# Patient Record
Sex: MALE | Race: WHITE | NOT HISPANIC OR LATINO | Employment: FULL TIME | URBAN - METROPOLITAN AREA
[De-identification: names, ages, dates, MRNs, and addresses within clinical notes are randomized per-mention and may not be internally consistent; named-entity substitution may affect disease eponyms.]

---

## 2017-01-12 ENCOUNTER — LAB CONVERSION - ENCOUNTER (OUTPATIENT)
Dept: OTHER | Facility: OTHER | Age: 57
End: 2017-01-12

## 2017-01-12 LAB — TSH SERPL DL<=0.05 MIU/L-ACNC: 1.79 MIU/L (ref 0.4–4.5)

## 2017-03-29 ENCOUNTER — ALLSCRIPTS OFFICE VISIT (OUTPATIENT)
Dept: OTHER | Facility: OTHER | Age: 57
End: 2017-03-29

## 2017-11-15 ENCOUNTER — GENERIC CONVERSION - ENCOUNTER (OUTPATIENT)
Dept: OTHER | Facility: OTHER | Age: 57
End: 2017-11-15

## 2018-01-18 NOTE — PROGRESS NOTES
Assessment    1  Encounter for preventive health examination (V70 0) (Z00 00)    Discussion/Summary  Impression: health maintenance visit  Currently, he eats an adequate diet and has an adequate exercise regimen  Prostate cancer screening: prostate cancer screening is current  Colorectal cancer screening: the risks and benefits of colorectal cancer screening were discussed and the patient declines colorectal cancer screening  The immunizations are up to date  Advice and education were given regarding nutrition, aerobic exercise, weight loss and cardiovascular risk reduction  Continue with current medications  Office visit one year  Referral to vascular surgery  I suggested evaluation for right shoulder through his employer/Work[employer]  I encouraged patient to have a colonoscopy  I discussed alternative to Fluoxetine  He is going to check with DOT regulations to ensure there are 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  History of Present Illness  HM, Adult Male: The patient is being seen for a health maintenance evaluation  The last health maintenance visit was >1 year(s) ago  Social History: Household members include spouse  He is   Work status: working full time  The patient is a former cigarette smoker  He reports occasional alcohol use  General Health: The patient's health since the last visit is described as good  He has regular dental visits  He denies vision problems  Vision care includes wearing reading glasses and an eye examination more than a year ago  He denies hearing loss  Lifestyle:  He consumes a diverse and healthy diet  He does not have any weight concerns  He exercises regularly  Exercise includes walking  He does not use tobacco    Screening: Prostate cancer screening includes last prostate-specific antigen testing 01/2017  Colorectal cancer screening includes no previous screening     Metabolic screening includes lipid profile performed 01/2017, glucose screening performed 01/2017 and thyroid function test performed 01/2017  Cardiovascular risk factors: hypertension and high LDL cholesterol  HPI: 59year old male here for wellness exam  active problems and PMH see chart  medications reviewed  labs 01/2017 see note  History of coronary artery disease status post stent  Nuclear stress test 12/2016 normal  No ischemia  Normal left ventricular function  Hyperlipidemia on Simvastatin 40 mg daily  lipid profile cholesterol 143  TGs 128  HDL 41  LDL 76  LFTs normal  FBS 91  hypertension  blood pressure stable on lisinopril 5 mg daily  he was taken off the beta blocker due to bradycardia  Review of Systems    Constitutional: 10 lb weight loss from 03/2015, but no recent weight gain and no recent weight loss  Eyes: no eyesight problems  Cardiovascular: CAD s/p stent  03/2015 nuclear stress test  No ischemia  No exercise induced dysrhythmias  Normal left ventricular systolic function  Ejection fraction 62%  No regional wall motion abnormalities  prior vascular screening normal including carotid artery u/s, AAA u/s and ALFREDO lower extremities  symptomatic varicose veins right leg  prior right leg venous ligation > 10 years ago , but no chest pain, no intermittent leg claudication, no palpitations and no extremity edema  Respiratory: no cough, no orthopnea, no wheezing, no shortness of breath during exertion and no PND  Gastrointestinal: no abdominal pain, no nausea, no vomiting, no constipation, no diarrhea and no blood in stools  Genitourinary: + ED  01/2017 PSA 0 6 , but no urinary hesitancy and no nocturia  Musculoskeletal: limb pain and history of gout stable on Allopurinol  uric acid level 10/2015 5 2  several month history of recurrent right shoulder pain  work related? no trauma or injury  , but no arthralgias and no myalgias  Integumentary: no rashes and no skin lesions  Neurological: no headache and no dizziness     Psychiatric: depression and no longer on Fluoxetine  PHQ 9 score 8  he did not feel well on Fluoxetine  he asked about generic Citalopram, but no anxiety  Endocrine: no muscle weakness  Hematologic/Lymphatic: no tendency for easy bleeding and no tendency for easy bruising  Active Problems    1  CAD (coronary atherosclerotic disease) (414 00) (I25 10)   2  Depression (311) (F32 9)   3  Essential hypertension (401 9) (I10)   4  Hyperlipidemia (272 4) (E78 5)   5  Hyperuricemia (790 6) (E79 0)   6  Male erectile dysfunction (607 84) (N52 9)    Past Medical History    · History of gout (V12 29) (Z87 39)    Surgical History    · History of Cath Stent Placement   · History of Hemorrhoidectomy   · History of Inguinal Hernia Repair   · History of Tonsillectomy    Family History  Father    · Family history of Aneurysm Of Abdominal Aorta   · Family history of Skin Cancer (V16 8)  Family History    · Family history of Coronary Artery Disease (V17 49)    Social History    · Former smoker (V15 82) (D56 609)    Current Meds   1  Allopurinol 300 MG Oral Tablet; Take 1 tablet daily; Therapy: 26WFC8139 to (Last Rx:55Cca8298)  Requested for: 97FSC8180 Ordered   2  Aspirin 81 MG TABS Recorded   3  Cialis 20 MG Oral Tablet; take as directed; Therapy: 00AXF2640 to (Jean Holder)  Requested for: 39WPZ5032; Last   Rx:07Oct2015 Ordered   4  [facility] Vitamin D 1000 UNIT CAPS Recorded   5  Lisinopril 5 MG Oral Tablet; Take 1 tablet daily as directed; Therapy: 75JIH9720 to (Last Rx:08Mar2017)  Requested for: 37PWB5286 Ordered   6  Simvastatin 40 MG Oral Tablet; Take 1 tablet daily as directed; Therapy: 65TIB9372 to (Last Rx:08Mar2017)  Requested for: 31QZT0282 Ordered    Allergies    1   No Known Drug Allergies    Vitals   Recorded: 03IDX1667 01:54PM   Temperature 96 4 F   Heart Rate 76   Respiration 16   Systolic 615   Diastolic 82   BP CUFF SIZE Large   Height 6 ft 2 in   Weight 242 lb    BMI Calculated 31 07   BSA Calculated 2 36 Physical Exam    Constitutional   General appearance: No acute distress, well appearing and well nourished  Eyes   Conjunctiva and lids: No erythema, swelling or discharge  Pupils and irises: Equal, round, reactive to light  Ophthalmoscopic examination: Normal fundi and optic discs  Ears, Nose, Mouth, and Throat   Otoscopic examination: Tympanic membranes translucent with normal light reflex  Canals patent without erythema  Oropharynx: Normal with no erythema, edema, exudate or lesions  Neck   Neck: Supple, symmetric, trachea midline, no masses  Thyroid: Normal, no thyromegaly  There were no thyroid nodules  Pulmonary   Auscultation of lungs: Clear to auscultation  Cardiovascular   Auscultation of heart: Normal rate and rhythm, normal S1 and S2, no murmurs  The heart rate was at 56 bpm  Heart sounds: no gallop heard  Carotid pulses: 2+ bilaterally  no bruit heard over the right carotid and no bruit heard over the left carotid  Abdominal aorta: Normal   Abdominal aorta: no bruit heard  Examination of extremities for edema and/or varicosities: Abnormal   no edema  varicosities noted on the right  no signs of superficial phlebitis  no no calf swelling or tenderness  Abdomen   Abdomen: Non-tender, no masses  Liver and spleen: No hepatomegaly or splenomegaly  Lymphatic   Palpation of lymph nodes in neck: No lymphadenopathy  Musculoskeletal   Gait and station: Normal     Range of motion: Normal   ROM right shoulder normal    Muscle strength/tone: Normal   Motor Strength Findings: normal upper extremity strength  Skin   Skin and subcutaneous tissue: Normal without rashes or lesions  Neurologic   Cranial nerves: Cranial nerves 2-12 intact      Psychiatric   Mood and affect: Normal        Results/Data  PHQ-9 Adult Depression Screening 29Mar2017 02:01PM User, s     Test Name Result Flag Reference   PHQ-9 Adult Depression Score 8     Over the last two weeks, how often have you been bothered by any of the following problems? Little interest or pleasure in doing things: More than half the days - 2  Feeling down, depressed, or hopeless: More than half the days - 2  Trouble falling or staying asleep, or sleeping too much: Not at all - 0  Feeling tired or having little energy: More than half the days - 2  Poor appetite or over eating: Not at all - 0  Feeling bad about yourself - or that you are a failure or have let yourself or your family down: Several days - 1  Trouble concentrating on things, such as reading the newspaper or watching television: Several days - 1  Moving or speaking so slowly that other people could have noticed   Or the opposite -  being so fidgety or restless that you have been moving around a lot more than usual: Not at all - 0  Thoughts that you would be better off dead, or of hurting yourself in some way: Not at all - 0   PHQ-9 Adult Depression Screening Negative     PHQ-9 Difficulty Level Somewhat difficult     PHQ-9 Severity Mild Depression       (1) CBC/PLT/DIFF [id number] 10:55AM [facility]     Test Name Result Flag Reference   WHITE BLOOD CELL COUNT 5 5 Thousand/uL  3 8-10 8   RED BLOOD CELL COUNT 5 05 Million/uL  4 20-5 80   HEMOGLOBIN 14 9 g/dL  13 2-17 1   HEMATOCRIT 44 8 %  38 5-50 0   MCV 88 7 fL  80 0-100 0   MCH 29 5 pg  27 0-33 0   MCHC 33 2 g/dL  32 0-36 0   RDW 13 9 %  11 0-15 0   PLATELET COUNT 830 Thousand/uL  140-400   MPV 9 5 fL  7 5-11 5   ABSOLUTE NEUTROPHILS 3394 cells/uL  6683-4135   ABSOLUTE LYMPHOCYTES 1689 cells/uL  850-3900   ABSOLUTE MONOCYTES 325 cells/uL  200-950   ABSOLUTE EOSINOPHILS 61 cells/uL     ABSOLUTE BASOPHILS 33 cells/uL  0-200   NEUTROPHILS 61 7 %     LYMPHOCYTES 30 7 %     MONOCYTES 5 9 %     EOSINOPHILS 1 1 %     BASOPHILS 0 6 %       (1) COMPREHENSIVE METABOLIC PANEL [id number] 96:52LZ [facility]     Test Name Result Flag Reference   GLUCOSE 91 mg/dL  65-99   Fasting reference interval   UREA NITROGEN (BUN) 15 mg/dL  7-25   CREATININE 0 84 mg/dL  0 70-1 33   For patients >52years of age, the reference limit  for Creatinine is approximately 13% higher for people  identified as -American  eGFR NON-AFR  AMERICAN 97 mL/min/1 73m2  > OR = 60   eGFR AFRICAN AMERICAN 112 mL/min/1 73m2  > OR = 60   BUN/CREATININE RATIO   3-29   NOT APPLICABLE (calc)   SODIUM 139 mmol/L  135-146   POTASSIUM 4 6 mmol/L  3 5-5 3   CHLORIDE 104 mmol/L     CARBON DIOXIDE 27 mmol/L  20-31   CALCIUM 9 3 mg/dL  8 6-10 3   PROTEIN, TOTAL 7 0 g/dL  6 1-8 1   ALBUMIN 4 5 g/dL  3 6-5 1   GLOBULIN 2 5 g/dL (calc)  1 9-3 7   ALBUMIN/GLOBULIN RATIO 1 8 (calc)  1 0-2 5   BILIRUBIN, TOTAL 0 6 mg/dL  0 2-1 2   ALKALINE PHOSPHATASE 60 U/L     AST 23 U/L  10-35   ALT 27 U/L  9-46     (1) LIPID PANEL, FASTING [id number] 10:55AM [facility] [place]     Test Name Result Flag Reference   CHOLESTEROL, TOTAL 143 mg/dL  125-200   HDL CHOLESTEROL 41 mg/dL  > OR = 40   TRIGLICERIDES 421 mg/dL  <150   LDL-CHOLESTEROL 76 mg/dL (calc)  <130   Desirable range <100 mg/dL for patients with CHD or  diabetes and <70 mg/dL for diabetic patients with  known heart disease  CHOL/HDLC RATIO 3 5 (calc)  < OR = 5 0   NON HDL CHOLESTEROL 102 mg/dL (calc)     Target for non-HDL cholesterol is 30 mg/dL higher than   LDL cholesterol target  (1) PSA (SCREEN) (Dx V76 44 Screen for Prostate Cancer) [id number] 10:55AM [facility]   REPORT COMMENT:  FASTING:YES     Test Name Result Flag Reference   PSA, TOTAL 0 6 ng/mL  < OR = 4 0   This test was performed using the Siemens  chemiluminescent method  Values obtained from  different assay methods cannot be used  interchangeably  PSA levels, regardless of  value, should not be interpreted as absolute  evidence of the presence or absence of disease       (Q) TSH, 3RD GENERATION [id number] 10:55AM [facility] [place]     Test Name Result Flag Reference   TSH 1 79 mIU/L  0 40-4 50     STRESS TEST ONLY, EXERCISE [id number] 12:00AM [facility] Ka, Shantelle Pina     Test Name Result Flag Reference   STRESS TEST ONLY, EXERCISE 12/14/2016       Summary / No summary entered :      No summary entered  Documents attached :      sStress Test - Coleman Goddard;  Enc: [id number] - Image Encounter - Coleman Goddard -      (Family Medicine) (Result Document)  (1) URIC ACID [id number] 10:25AM Coleman Goddard     Test Name Result Flag Reference   URIC ACID 5 2 mg/dL  4 0-8 0   Therapeutic target for gout patients: <6 0 mg/dL       Signatures   Electronically signed by : ROBBIE Apple ; Mar 29 2017  3:37PM EST                       (Author)

## 2018-01-22 VITALS
TEMPERATURE: 96.4 F | DIASTOLIC BLOOD PRESSURE: 82 MMHG | WEIGHT: 242 LBS | HEART RATE: 76 BPM | HEIGHT: 74 IN | BODY MASS INDEX: 31.06 KG/M2 | RESPIRATION RATE: 16 BRPM | SYSTOLIC BLOOD PRESSURE: 110 MMHG

## 2018-03-05 DIAGNOSIS — E78.00 HYPERCHOLESTEREMIA: Primary | ICD-10-CM

## 2018-03-05 DIAGNOSIS — I10 ESSENTIAL HYPERTENSION: ICD-10-CM

## 2018-03-05 RX ORDER — LISINOPRIL 5 MG/1
TABLET ORAL
Qty: 90 TABLET | Refills: 3 | Status: SHIPPED | OUTPATIENT
Start: 2018-03-05 | End: 2019-03-03 | Stop reason: SDUPTHER

## 2018-03-05 RX ORDER — SIMVASTATIN 40 MG
TABLET ORAL
Qty: 90 TABLET | Refills: 3 | Status: SHIPPED | OUTPATIENT
Start: 2018-03-05 | End: 2019-03-03 | Stop reason: SDUPTHER

## 2018-09-03 DIAGNOSIS — E79.0 HYPERURICEMIA: Primary | ICD-10-CM

## 2018-09-03 RX ORDER — ALLOPURINOL 300 MG/1
TABLET ORAL
Qty: 90 TABLET | Refills: 3 | Status: SHIPPED | OUTPATIENT
Start: 2018-09-03 | End: 2019-09-01 | Stop reason: SDUPTHER

## 2018-11-09 PROBLEM — Z12.11 SCREEN FOR COLON CANCER: Status: ACTIVE | Noted: 2018-11-09

## 2018-12-30 ENCOUNTER — ANESTHESIA EVENT (OUTPATIENT)
Dept: PERIOP | Facility: AMBULARY SURGERY CENTER | Age: 58
End: 2018-12-30
Payer: COMMERCIAL

## 2019-01-10 NOTE — ANESTHESIA PREPROCEDURE EVALUATION
Review of Systems/Medical History          Cardiovascular  Hyperlipidemia, Hypertension , CAD , CABG: X3 ,    Pulmonary  Not a smoker ,        GI/Hepatic    No GERD ,             Endo/Other     GYN       Hematology   Musculoskeletal       Neurology   Psychology           Physical Exam    Airway    Mallampati score: I  TM Distance: >3 FB  Neck ROM: full     Dental   No notable dental hx     Cardiovascular      Pulmonary      Other Findings        Anesthesia Plan  ASA Score- 3     Anesthesia Type- IV sedation with anesthesia with ASA Monitors  Additional Monitors:   Airway Plan:         Plan Factors-Patient not instructed to abstain from smoking on day of procedure  Patient did not smoke on day of surgery  Induction- intravenous  Postoperative Plan-     Informed Consent- Anesthetic plan and risks discussed with patient and spouse  I personally reviewed this patient with the CRNA  Discussed and agreed on the Anesthesia Plan with the CRNA  Carmen Olmos

## 2019-01-11 ENCOUNTER — HOSPITAL ENCOUNTER (OUTPATIENT)
Facility: AMBULARY SURGERY CENTER | Age: 59
Setting detail: OUTPATIENT SURGERY
Discharge: HOME | End: 2019-01-11
Attending: COLON & RECTAL SURGERY | Admitting: COLON & RECTAL SURGERY
Payer: COMMERCIAL

## 2019-01-11 ENCOUNTER — ANESTHESIA (OUTPATIENT)
Dept: PERIOP | Facility: AMBULARY SURGERY CENTER | Age: 59
End: 2019-01-11
Payer: COMMERCIAL

## 2019-01-11 VITALS
BODY MASS INDEX: 28.23 KG/M2 | TEMPERATURE: 98.9 F | SYSTOLIC BLOOD PRESSURE: 133 MMHG | OXYGEN SATURATION: 99 % | HEIGHT: 75 IN | RESPIRATION RATE: 18 BRPM | HEART RATE: 54 BPM | WEIGHT: 227 LBS | DIASTOLIC BLOOD PRESSURE: 82 MMHG

## 2019-01-11 PROCEDURE — 99243 OFF/OP CNSLTJ NEW/EST LOW 30: CPT | Performed by: COLON & RECTAL SURGERY

## 2019-01-11 PROCEDURE — G0121 COLON CA SCRN NOT HI RSK IND: HCPCS | Performed by: COLON & RECTAL SURGERY

## 2019-01-11 RX ORDER — NITROGLYCERIN 0.4 MG/1
0.4 TABLET SUBLINGUAL
COMMUNITY
End: 2021-11-17 | Stop reason: SDUPTHER

## 2019-01-11 RX ORDER — PROPOFOL 10 MG/ML
INJECTION, EMULSION INTRAVENOUS AS NEEDED
Status: DISCONTINUED | OUTPATIENT
Start: 2019-01-11 | End: 2019-01-11 | Stop reason: SURG

## 2019-01-11 RX ORDER — SODIUM CHLORIDE 9 MG/ML
125 INJECTION, SOLUTION INTRAVENOUS CONTINUOUS
Status: DISCONTINUED | OUTPATIENT
Start: 2019-01-11 | End: 2019-01-11 | Stop reason: HOSPADM

## 2019-01-11 RX ORDER — LIDOCAINE HYDROCHLORIDE 10 MG/ML
INJECTION, SOLUTION INFILTRATION; PERINEURAL AS NEEDED
Status: DISCONTINUED | OUTPATIENT
Start: 2019-01-11 | End: 2019-01-11 | Stop reason: SURG

## 2019-01-11 RX ADMIN — PROPOFOL 50 MG: 10 INJECTION, EMULSION INTRAVENOUS at 09:51

## 2019-01-11 RX ADMIN — SODIUM CHLORIDE 125 ML/HR: 0.9 INJECTION, SOLUTION INTRAVENOUS at 09:16

## 2019-01-11 RX ADMIN — PROPOFOL 50 MG: 10 INJECTION, EMULSION INTRAVENOUS at 09:57

## 2019-01-11 RX ADMIN — PROPOFOL 150 MG: 10 INJECTION, EMULSION INTRAVENOUS at 09:48

## 2019-01-11 RX ADMIN — PROPOFOL 30 MG: 10 INJECTION, EMULSION INTRAVENOUS at 10:01

## 2019-01-11 RX ADMIN — PROPOFOL 50 MG: 10 INJECTION, EMULSION INTRAVENOUS at 09:53

## 2019-01-11 RX ADMIN — PROPOFOL 50 MG: 10 INJECTION, EMULSION INTRAVENOUS at 09:55

## 2019-01-11 RX ADMIN — LIDOCAINE HYDROCHLORIDE ANHYDROUS 50 MG: 10 INJECTION, SOLUTION INFILTRATION at 09:48

## 2019-01-11 RX ADMIN — PROPOFOL 50 MG: 10 INJECTION, EMULSION INTRAVENOUS at 09:50

## 2019-01-11 NOTE — ANESTHESIA POSTPROCEDURE EVALUATION
Post-Op Assessment Note      CV Status:  Stable    Mental Status:  Alert and awake    Hydration Status:  Euvolemic    PONV Controlled:  Controlled    Airway Patency:  Patent    Post Op Vitals Reviewed: Yes          Staff: CRNA           /69 (01/11/19 1007)    Temp     Pulse 57 (01/11/19 1007)   Resp 15 (01/11/19 1007)    SpO2 99 % (01/11/19 1007)

## 2019-01-11 NOTE — OP NOTE
Colonoscopy Procedure Note  PATIENT NAME: Jose Willingham    :  1960  MRN: [id number]  Pt Location: [facility]  GI ROOM 01    SURGERY DATE: 2019    Surgeon(s) and Role:     * Ronald Ny MD - Primary    Preop Diagnosis:  Screen for colon cancer [Z12 11]    Post-Op Diagnosis Codes:     * Screen for colon cancer [Z12 11]    Procedure(s) (LRB):  COLONOSCOPY (N/A)    Specimen(s):  * No specimens in log *    Estimated Blood Loss:   Minimal    Drains:       Anesthesia Type:   IV Sedation with Anesthesia    Operative Indications:  Screen for colon cancer [Z12 11]    Procedure Details     Informed consent was obtained for the procedure, including sedation  Risks of perforation, hemorrhage, adverse drug reaction and aspiration were discussed  The patient was placed in the left lateral decubitus position  Based on the pre-procedure assessment, including review of the patient's medical history, medications, allergies, and review of systems, he had been deemed to be an appropriate candidate for conscious sedation; he was therefore sedated with the medications listed below  The patient was monitored continuously with ECG tracing, pulse oximetry, blood pressure monitoring, and direct observations  A rectal examination was performed  The variable-stiffness pediatric colonoscope was inserted into the rectum and advanced under direct vision to the cecum, which was identified by the ileocecal valve and appendiceal orifice  The quality of the colonic preparation was excellent  A careful inspection was made as the colonoscope was withdrawn, including a retroflexed view of the rectum; findings and interventions are described below  Appropriate photodocumentation was obtained      Findings:  -diverticulosis, moderate in degree, involving the sigmoid  -Otherwise no polyp tumor inflammation or mucosal pathology was seen throughout the colon up to cecum             Complications:  None; patient tolerated the procedure well             Disposition: PACU            Condition: stable    Attending Attestation: I was present for the entire procedure    Impression:    --diverticulosis, moderate in degree, involving the sigmoid  -Otherwise no polyp tumor inflammation or mucosal pathology was seen throughout the colon up to cecum    Recommendations:  -Repeat colonoscopy in 5 years   -Call [phone] with any signs or symptoms of diverticulitis or rectal bleeding due to  diverticular bleed        SIGNATURE: Alvaro Buckley MD  DATE: January 11, 2019  TIME: 10:08 AM

## 2019-01-11 NOTE — H&P
History and Physical   Colon and Rectal Surgery   Hamilton Allen 59 y o  male MRN: [id number]  Unit/Bed#: OR POOL Encounter: [id number]  01/11/19   9:31 AM      No chief complaint on file  History of Present Illness   HPI:  Hamilton Allen is a 59 y o  male who presents with screening colonoscopy  Historical Information   Past Medical History:   Diagnosis Date    CAD (coronary artery disease)     s/p stents    Hyperlipidemia     Hypertension      Past Surgical History:   Procedure Laterality Date    CORONARY ANGIOPLASTY WITH STENT PLACEMENT      HEMORROIDECTOMY      HERNIA REPAIR         Meds/Allergies     Prescriptions Prior to Admission   Medication    allopurinol (ZYLOPRIM) 300 mg tablet    aspirin 81 MG tablet    Cholecalciferol 1000 units capsule    lisinopril (ZESTRIL) 5 mg tablet    simvastatin (ZOCOR) 40 mg tablet    nitroglycerin (NITROSTAT) 0 4 mg SL tablet         Current Facility-Administered Medications:     sodium chloride 0 9 % infusion, 125 mL/hr, Intravenous, Continuous, Wil Latif MD, Last Rate: 125 mL/hr at 01/11/19 0916, 125 mL/hr at 01/11/19 0916    No Known Allergies      Social History   History   Alcohol Use    Yes     Comment: 1 per week     History   Drug Use No     History   Smoking Status    Former Smoker    Packs/day: 3 00    Years: 10 00    Quit date: 1/11/1989   Smokeless Tobacco    Never Used         Family History: History reviewed  No pertinent family history        Objective     Current Vitals:   Blood Pressure: 134/78 (01/11/19 0913)  Pulse: 63 (01/11/19 0913)  Temperature: (!) 97 1 °F (36 2 °C) (01/11/19 0913)  Temp Source: Temporal (01/11/19 0913)  Respirations: 18 (01/11/19 0913)  Height: 6' 3" (190 5 cm) (01/11/19 0913)  Weight - Scale: 103 kg (227 lb) (01/11/19 0913)  SpO2: 99 % (01/11/19 0913)  No intake or output data in the 24 hours ending 01/11/19 0931    Physical Exam:  General: No acute distress  Eyes: Normal   ENT: Normal   Neck: No JVD  Pulm: Normal in A&P  CV: NSR no murmur  Abdomen: Soft and normal on palpation, no mass, no tenderness, no guarding  Rectal: Normal sphincter tone, no perianal skin lesions  Extremities: Normal  Lymphatics: Normal        Lab Results: I have personally reviewed pertinent lab results  Imaging: I have personally reviewed pertinent reports  Patient was consented by myself for procedure as explained earlier with all the risks and benefits described  All questions answered  ASSESSMENT:  Rod Kincaid is a 59 y o  male who presents with screening colonoscopy  Ila Figueroa PLAN:  screening colonoscopy

## 2019-03-03 DIAGNOSIS — E78.00 HYPERCHOLESTEREMIA: ICD-10-CM

## 2019-03-03 DIAGNOSIS — I10 ESSENTIAL HYPERTENSION: ICD-10-CM

## 2019-03-04 RX ORDER — LISINOPRIL 5 MG/1
TABLET ORAL
Qty: 90 TABLET | Refills: 3 | Status: SHIPPED | OUTPATIENT
Start: 2019-03-04 | End: 2020-02-27

## 2019-03-04 RX ORDER — SIMVASTATIN 40 MG
TABLET ORAL
Qty: 90 TABLET | Refills: 3 | Status: SHIPPED | OUTPATIENT
Start: 2019-03-04 | End: 2020-02-27

## 2019-09-01 DIAGNOSIS — E79.0 HYPERURICEMIA: ICD-10-CM

## 2019-09-02 RX ORDER — ALLOPURINOL 300 MG/1
TABLET ORAL
Qty: 90 TABLET | Refills: 4 | Status: SHIPPED | OUTPATIENT
Start: 2019-09-02 | End: 2020-11-25

## 2020-01-23 ENCOUNTER — OFFICE VISIT (OUTPATIENT)
Dept: FAMILY MEDICINE CLINIC | Facility: CLINIC | Age: 60
End: 2020-01-23
Payer: COMMERCIAL

## 2020-01-23 VITALS
HEART RATE: 72 BPM | BODY MASS INDEX: 29.78 KG/M2 | SYSTOLIC BLOOD PRESSURE: 122 MMHG | HEIGHT: 75 IN | DIASTOLIC BLOOD PRESSURE: 82 MMHG | TEMPERATURE: 97.9 F | WEIGHT: 239.5 LBS

## 2020-01-23 DIAGNOSIS — Z12.5 SCREENING FOR PROSTATE CANCER: ICD-10-CM

## 2020-01-23 DIAGNOSIS — Z00.00 ROUTINE ADULT HEALTH MAINTENANCE: Primary | ICD-10-CM

## 2020-01-23 DIAGNOSIS — I25.10 CAD S/P PERCUTANEOUS CORONARY ANGIOPLASTY: ICD-10-CM

## 2020-01-23 DIAGNOSIS — Z11.59 ENCOUNTER FOR HEPATITIS C SCREENING TEST FOR LOW RISK PATIENT: ICD-10-CM

## 2020-01-23 DIAGNOSIS — Z11.4 ENCOUNTER FOR SCREENING FOR HIV: ICD-10-CM

## 2020-01-23 DIAGNOSIS — Z98.61 CAD S/P PERCUTANEOUS CORONARY ANGIOPLASTY: ICD-10-CM

## 2020-01-23 DIAGNOSIS — M1A.0720 CHRONIC IDIOPATHIC GOUT INVOLVING TOE OF LEFT FOOT WITHOUT TOPHUS: ICD-10-CM

## 2020-01-23 DIAGNOSIS — E55.9 VITAMIN D DEFICIENCY: ICD-10-CM

## 2020-01-23 DIAGNOSIS — E78.49 OTHER HYPERLIPIDEMIA: ICD-10-CM

## 2020-01-23 DIAGNOSIS — I83.811 VARICOSE VEINS OF RIGHT LOWER EXTREMITY WITH PAIN: ICD-10-CM

## 2020-01-23 DIAGNOSIS — I10 ESSENTIAL (PRIMARY) HYPERTENSION: ICD-10-CM

## 2020-01-23 PROBLEM — Z12.11 SCREEN FOR COLON CANCER: Status: RESOLVED | Noted: 2018-11-09 | Resolved: 2020-01-23

## 2020-01-23 PROCEDURE — 99396 PREV VISIT EST AGE 40-64: CPT | Performed by: FAMILY MEDICINE

## 2020-01-23 PROCEDURE — 3074F SYST BP LT 130 MM HG: CPT | Performed by: FAMILY MEDICINE

## 2020-01-23 PROCEDURE — 3008F BODY MASS INDEX DOCD: CPT | Performed by: FAMILY MEDICINE

## 2020-01-23 PROCEDURE — 3079F DIAST BP 80-89 MM HG: CPT | Performed by: FAMILY MEDICINE

## 2020-01-23 NOTE — PROGRESS NOTES
[address] 56 y o  male   DATE: January 23, 2020   Assessment and Plan:  56 y o  male exam      1  Health Maintenance  - Colonoscopy? 1/11/2019- [id number] with Dr Shara Bray  - Labs: PSA, uric acid, CMP, TSH, CBC, microalb:creat, IV, Hep C  - Immunizations: Reviewed  TDAP UTD 2017  Recommend yearly flu vaccine, PPSV23 and Shingrix, patient declined all      2  Other diagnoses addressed today:   Problem List Items Addressed This Visit        Cardiovascular and Mediastinum    CAD S/P percutaneous coronary angioplasty     S/p 3 stents after abnormal cath in 2008  F/w [facility] (Dr Oli Salas)  Asymptomatic, normal stress Echo 10/31/2018  Continue ASA 81mg, simvastatin 40mg, ramipril 5mg  BB discontinued by Cardiology 2 years ago per patient         Relevant Orders    TSH, 3rd generation with Free T4 reflex    CBC and differential    Essential (primary) hypertension     BP Readings from Last 3 Encounters:   01/23/20 122/82   01/11/19 133/82   03/29/17 110/82     Lab Results   Component Value Date    CREATININE 0 84 01/11/2017     Controlled on current regimen: Ramipril 5mg daily  Check BMP           Relevant Orders    Comprehensive metabolic panel    TSH, 3rd generation with Free T4 reflex    Microalbumin / creatinine urine ratio       Musculoskeletal and Integument    Chronic idiopathic gout 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 tophus     Has been on Allopurinol 300mg daily since 2008, no flare ups since then  Check uric acid         Relevant Orders    Uric acid       Other    Other hyperlipidemia     Recent FLP for Cardiology 11/19 WNL  Reviewed healthy, low cholesterol diet  Continue Simvastatin 40mg daily           Vitamin D deficiency    Relevant Orders    Vitamin D 25 hydroxy      Other Visit Diagnoses     Routine adult health maintenance    -  Primary    Encounter for screening for HIV        Relevant Orders    HIV 1/2 AG-AB combo    Encounter for hepatitis C screening test for low risk patient        Relevant Orders    Hepatitis C antibody    Varicose veins of right lower extremity with pain        Relevant Orders    Ambulatory referral to Vascular Surgery    Screening for prostate cancer        Relevant Orders    PSA, Total Screen          BMI Counseling: Body mass index is 29 94 kg/m²  The BMI is above normal  Nutrition recommendations include moderation in carbohydrate intake and reducing intake of cholesterol  RTC 1 year for annual  visit or sooner PRN    Subjective:    Alice Whitaker is a 56 y o  male and is here for his comprehensive physical exam      No acute complaints  Last seen in this office 2 5 years ago  Works for [employer] and has worked there for 42 years   History of CAD s/p stents in 2008- no chest pain, palpitations, MOYA   Recently saw his cardiology  Gout- last episode of gout was when he had stents was placed, now on allopurinol, always on big toe  HLD- well controlled with simvastatin, eats an unhealthy diet, no routine exercise  Overdue for dental visit and eye doctor    Histories Updated and Reviewed 1/23/2020:  Patient's Medications   New Prescriptions    No medications on file   Previous Medications    ALLOPURINOL (ZYLOPRIM) 300 MG TABLET    TAKE 1 TABLET DAILY    ASPIRIN 81 MG TABLET    Take 81 mg by mouth daily    CHOLECALCIFEROL 1000 UNITS CAPSULE    Take 1 capsule by mouth daily    LISINOPRIL (ZESTRIL) 5 MG TABLET    TAKE 1 TABLET DAILY AS DIRECTED    NITROGLYCERIN (NITROSTAT) 0 4 MG SL TABLET    Place 0 4 mg under the tongue    SIMVASTATIN (ZOCOR) 40 MG TABLET    TAKE 1 TABLET DAILY AS DIRECTED   Modified Medications    No medications on file   Discontinued Medications    No medications on file     No Known Allergies  Past Medical History:   Diagnosis Date    CAD (coronary artery disease)     s/p stents    Hyperlipidemia     Hypertension      Social History     Socioeconomic History    Marital status: /Civil Union     Spouse name: Not on file  Number of children: Not on file    Years of education: Not on file    Highest education level: Not on file   Occupational History    Not on file   Social Needs    Financial resource strain: Not on file    Food insecurity:     Worry: Not on file     Inability: Not on file    Transportation needs:     Medical: Not on file     Non-medical: Not on file   Tobacco Use    Smoking status: Former Smoker     Packs/day: 3 00     Years: 10 00     Pack years: 30 00     Last attempt to quit: 1989     Years since quittin 0    Smokeless tobacco: Never Used   Substance and Sexual Activity    Alcohol use: Yes     Comment: 1 per week    Drug use: No    Sexual activity: Not on file   Lifestyle    Physical activity:     Days per week: Not on file     Minutes per session: Not on file    Stress: Not on file   Relationships    Social connections:     Talks on phone: Not on file     Gets together: Not on file     Attends Jain service: Not on file     Active member of club or organization: Not on file     Attends meetings of clubs or organizations: Not on file     Relationship status: Not on file    Intimate partner violence:     Fear of current or ex partner: Not on file     Emotionally abused: Not on file     Physically abused: Not on file     Forced sexual activity: Not on file   Other Topics Concern    Not on file   Social History Narrative    Not on file     Immunization History   Administered Date(s) Administered    Tdap 2017     Review of Systems:  Review of Systems   Constitutional: Negative for chills and fever  HENT: Negative for ear pain  Eyes: Negative for visual disturbance  Respiratory: Negative for cough and shortness of breath  Cardiovascular: Positive for leg swelling (right leg varicose vein)  Negative for chest pain and palpitations  Gastrointestinal: Negative for abdominal pain, diarrhea, nausea and vomiting  Musculoskeletal: Positive for arthralgias     Skin: Negative for rash  Neurological: Negative for headaches  Hematological: Does not bruise/bleed easily  PHQ-9 Depression Screening    PHQ-9:    Frequency of the following problems over the past two weeks:       Little interest or pleasure in doing things:  0 - not at all  Feeling down, depressed, or hopeless:  0 - not at all  PHQ-2 Score:  0         Objective:  /82 (BP Location: Left arm, Patient Position: Sitting, Cuff Size: Standard)   Pulse 72   Temp 97 9 °F (36 6 °C)   Ht 6' 3" (1 905 m)   Wt 109 kg (239 lb 8 oz)   BMI 29 94 kg/m²   Physical Exam   Constitutional: He is oriented to person, place, and time  He appears well-developed and well-nourished  No distress  HENT:   Head: Normocephalic and atraumatic  Mouth/Throat: Oropharynx is clear and moist  No oropharyngeal exudate  Eyes: Pupils are equal, round, and reactive to light  EOM are normal  Right eye exhibits no discharge  Left eye exhibits no discharge  Neck: Normal range of motion  Neck supple  No JVD present  Cardiovascular: Normal rate, regular rhythm and normal heart sounds  No murmur heard  Pulmonary/Chest: Effort normal and breath sounds normal  No stridor  No respiratory distress  He has no wheezes  Abdominal: Soft  Bowel sounds are normal  There is no tenderness  There is no rebound and no guarding  Musculoskeletal: Normal range of motion  He exhibits no edema or tenderness  Neurological: He is alert and oriented to person, place, and time  Skin: Skin is warm and dry  He is not diaphoretic  No erythema  Psychiatric: He has a normal mood and affect  His behavior is normal    Vitals reviewed  Patient Care Team:  Jody Alfredo MD as PCP - Charline Howard MD as Hollis Cool MD    Note: Portions of the record may have been created with voice recognition software    Occasional wrong word or "sound a like" substitutions may have occurred due to the inherent limitations of voice recognition software  Read the chart carefully and recognize, using context, where substitutions have occurred

## 2020-01-24 NOTE — ASSESSMENT & PLAN NOTE
Recent FLP for Cardiology 11/19 WNL  Reviewed healthy, low cholesterol diet  Continue Simvastatin 40mg daily

## 2020-01-24 NOTE — ASSESSMENT & PLAN NOTE
BP Readings from Last 3 Encounters:   01/23/20 122/82   01/11/19 133/82   03/29/17 110/82     Lab Results   Component Value Date    CREATININE 0 84 01/11/2017     Controlled on current regimen: Ramipril 5mg daily  Check BMP

## 2020-01-24 NOTE — ASSESSMENT & PLAN NOTE
S/p 3 stents after abnormal cath in 2008  F/w [facility] (Dr Maikel Carranza)  Asymptomatic, normal stress Echo 10/31/2018  Continue ASA 81mg, simvastatin 40mg, ramipril 5mg  BB discontinued by Cardiology 2 years ago per patient

## 2020-02-01 LAB
CREAT ?TM UR-SCNC: 21.3 UMOL/L
EXTERNAL HIV SCREEN: NORMAL
HCV AB SER-ACNC: NEGATIVE

## 2020-02-27 DIAGNOSIS — E78.00 HYPERCHOLESTEREMIA: ICD-10-CM

## 2020-02-27 DIAGNOSIS — I10 ESSENTIAL HYPERTENSION: ICD-10-CM

## 2020-02-27 RX ORDER — SIMVASTATIN 40 MG
TABLET ORAL
Qty: 90 TABLET | Refills: 4 | Status: SHIPPED | OUTPATIENT
Start: 2020-02-27 | End: 2021-05-24

## 2020-02-27 RX ORDER — LISINOPRIL 5 MG/1
TABLET ORAL
Qty: 90 TABLET | Refills: 4 | Status: SHIPPED | OUTPATIENT
Start: 2020-02-27 | End: 2021-05-24

## 2020-04-10 ENCOUNTER — TELEMEDICINE (OUTPATIENT)
Dept: FAMILY MEDICINE CLINIC | Facility: CLINIC | Age: 60
End: 2020-04-10
Payer: COMMERCIAL

## 2020-04-10 DIAGNOSIS — Z98.61 CAD S/P PERCUTANEOUS CORONARY ANGIOPLASTY: Primary | ICD-10-CM

## 2020-04-10 DIAGNOSIS — I10 ESSENTIAL (PRIMARY) HYPERTENSION: ICD-10-CM

## 2020-04-10 DIAGNOSIS — I25.10 CAD S/P PERCUTANEOUS CORONARY ANGIOPLASTY: Primary | ICD-10-CM

## 2020-04-10 PROCEDURE — 99214 OFFICE O/P EST MOD 30 MIN: CPT | Performed by: FAMILY MEDICINE

## 2020-11-25 DIAGNOSIS — E79.0 HYPERURICEMIA: ICD-10-CM

## 2020-11-25 RX ORDER — ALLOPURINOL 300 MG/1
TABLET ORAL
Qty: 90 TABLET | Refills: 3 | Status: SHIPPED | OUTPATIENT
Start: 2020-11-25 | End: 2021-11-22 | Stop reason: SDUPTHER

## 2021-05-24 DIAGNOSIS — I10 ESSENTIAL HYPERTENSION: ICD-10-CM

## 2021-05-24 DIAGNOSIS — E78.00 HYPERCHOLESTEREMIA: ICD-10-CM

## 2021-05-24 RX ORDER — SIMVASTATIN 40 MG
TABLET ORAL
Qty: 90 TABLET | Refills: 0 | Status: SHIPPED | OUTPATIENT
Start: 2021-05-24 | End: 2021-08-23 | Stop reason: SDUPTHER

## 2021-05-24 RX ORDER — LISINOPRIL 5 MG/1
TABLET ORAL
Qty: 90 TABLET | Refills: 0 | Status: SHIPPED | OUTPATIENT
Start: 2021-05-24 | End: 2021-08-23 | Stop reason: SDUPTHER

## 2021-08-23 DIAGNOSIS — I10 ESSENTIAL HYPERTENSION: ICD-10-CM

## 2021-08-23 DIAGNOSIS — E78.00 HYPERCHOLESTEREMIA: ICD-10-CM

## 2021-08-23 RX ORDER — SIMVASTATIN 40 MG
40 TABLET ORAL DAILY
Qty: 90 TABLET | Refills: 0 | Status: SHIPPED | OUTPATIENT
Start: 2021-08-23

## 2021-08-23 RX ORDER — LISINOPRIL 5 MG/1
5 TABLET ORAL DAILY
Qty: 90 TABLET | Refills: 0 | Status: SHIPPED | OUTPATIENT
Start: 2021-08-23

## 2021-09-01 ENCOUNTER — TELEPHONE (OUTPATIENT)
Dept: FAMILY MEDICINE CLINIC | Facility: CLINIC | Age: 61
End: 2021-09-01

## 2021-09-01 NOTE — TELEPHONE ENCOUNTER
[facility] called stating insurance does not cover Zocor  Patient's prescription would just have to say Simvastatin  Please advise  Can this be resent?  [phone]

## 2021-09-01 NOTE — TELEPHONE ENCOUNTER
I called [facility] and spoke with Pharmacist Isabel Alcazar gave verbal for the generic Simvastatin with 3 refills  And also the Lisinopril

## 2021-11-09 ENCOUNTER — APPOINTMENT (OUTPATIENT)
Dept: LAB | Facility: CLINIC | Age: 61
End: 2021-11-09
Payer: COMMERCIAL

## 2021-11-09 DIAGNOSIS — E79.0 HYPERURICEMIA: ICD-10-CM

## 2021-11-09 DIAGNOSIS — Z12.5 SCREENING FOR PROSTATE CANCER: ICD-10-CM

## 2021-11-09 LAB
PSA SERPL-MCNC: 0.5 NG/ML (ref 0–4)
URATE SERPL-MCNC: 4.7 MG/DL (ref 4.2–8)

## 2021-11-09 PROCEDURE — 84550 ASSAY OF BLOOD/URIC ACID: CPT

## 2021-11-09 PROCEDURE — G0103 PSA SCREENING: HCPCS

## 2021-11-17 ENCOUNTER — OFFICE VISIT (OUTPATIENT)
Dept: FAMILY MEDICINE CLINIC | Facility: CLINIC | Age: 61
End: 2021-11-17
Payer: COMMERCIAL

## 2021-11-17 VITALS
TEMPERATURE: 96.3 F | SYSTOLIC BLOOD PRESSURE: 118 MMHG | RESPIRATION RATE: 18 BRPM | HEIGHT: 74 IN | HEART RATE: 72 BPM | BODY MASS INDEX: 30.42 KG/M2 | DIASTOLIC BLOOD PRESSURE: 82 MMHG | OXYGEN SATURATION: 97 % | WEIGHT: 237 LBS

## 2021-11-17 DIAGNOSIS — Z98.61 CAD S/P PERCUTANEOUS CORONARY ANGIOPLASTY: ICD-10-CM

## 2021-11-17 DIAGNOSIS — E78.2 MIXED HYPERLIPIDEMIA: ICD-10-CM

## 2021-11-17 DIAGNOSIS — E55.9 VITAMIN D DEFICIENCY: ICD-10-CM

## 2021-11-17 DIAGNOSIS — I10 ESSENTIAL (PRIMARY) HYPERTENSION: ICD-10-CM

## 2021-11-17 DIAGNOSIS — R68.82 LOW LIBIDO: ICD-10-CM

## 2021-11-17 DIAGNOSIS — I25.10 CAD S/P PERCUTANEOUS CORONARY ANGIOPLASTY: ICD-10-CM

## 2021-11-17 DIAGNOSIS — Z00.00 WELL ADULT EXAM: Primary | ICD-10-CM

## 2021-11-17 DIAGNOSIS — R73.01 IMPAIRED FASTING GLUCOSE: ICD-10-CM

## 2021-11-17 DIAGNOSIS — N52.9 MALE ERECTILE DISORDER: ICD-10-CM

## 2021-11-17 PROCEDURE — 3008F BODY MASS INDEX DOCD: CPT | Performed by: FAMILY MEDICINE

## 2021-11-17 PROCEDURE — 1036F TOBACCO NON-USER: CPT | Performed by: FAMILY MEDICINE

## 2021-11-17 PROCEDURE — 99396 PREV VISIT EST AGE 40-64: CPT | Performed by: FAMILY MEDICINE

## 2021-11-17 PROCEDURE — 3725F SCREEN DEPRESSION PERFORMED: CPT | Performed by: FAMILY MEDICINE

## 2021-11-17 RX ORDER — NITROGLYCERIN 0.4 MG/1
0.4 TABLET SUBLINGUAL
Qty: 25 TABLET | Refills: 1 | Status: SHIPPED | OUTPATIENT
Start: 2021-11-17

## 2021-11-17 RX ORDER — TADALAFIL 20 MG/1
20 TABLET ORAL DAILY PRN
Qty: 10 TABLET | Refills: 3 | Status: SHIPPED | OUTPATIENT
Start: 2021-11-17 | End: 2021-11-22 | Stop reason: SDUPTHER

## 2021-11-22 DIAGNOSIS — N52.9 MALE ERECTILE DISORDER: ICD-10-CM

## 2021-11-22 DIAGNOSIS — E79.0 HYPERURICEMIA: ICD-10-CM

## 2021-11-22 RX ORDER — TADALAFIL 20 MG/1
20 TABLET ORAL DAILY PRN
Qty: 10 TABLET | Refills: 3 | Status: SHIPPED | OUTPATIENT
Start: 2021-11-22

## 2021-11-22 RX ORDER — ALLOPURINOL 300 MG/1
300 TABLET ORAL DAILY
Qty: 90 TABLET | Refills: 3 | Status: SHIPPED | OUTPATIENT
Start: 2021-11-22

## 2021-11-22 RX ORDER — ALLOPURINOL 300 MG/1
TABLET ORAL
Qty: 90 TABLET | Refills: 3 | OUTPATIENT
Start: 2021-11-22

## 2022-08-26 DIAGNOSIS — I10 ESSENTIAL HYPERTENSION: ICD-10-CM

## 2022-08-26 RX ORDER — LISINOPRIL 5 MG/1
5 TABLET ORAL DAILY
Qty: 90 TABLET | Refills: 3 | Status: SHIPPED | OUTPATIENT
Start: 2022-08-26

## 2022-11-10 DIAGNOSIS — E78.00 HYPERCHOLESTEREMIA: ICD-10-CM

## 2022-11-10 DIAGNOSIS — I10 ESSENTIAL HYPERTENSION: ICD-10-CM

## 2022-11-10 DIAGNOSIS — Z12.5 SCREENING FOR PROSTATE CANCER: ICD-10-CM

## 2022-11-10 DIAGNOSIS — E79.0 HYPERURICEMIA: ICD-10-CM

## 2022-11-10 DIAGNOSIS — E79.0 HYPERURICEMIA: Primary | ICD-10-CM

## 2022-11-10 DIAGNOSIS — E78.2 MIXED HYPERLIPIDEMIA: ICD-10-CM

## 2022-11-10 DIAGNOSIS — R73.01 IMPAIRED FASTING GLUCOSE: ICD-10-CM

## 2022-11-10 RX ORDER — ALLOPURINOL 300 MG/1
300 TABLET ORAL DAILY
Qty: 90 TABLET | Refills: 3 | Status: SHIPPED | OUTPATIENT
Start: 2022-11-10

## 2022-11-10 NOTE — TELEPHONE ENCOUNTER
[facility]  Patient requesting orders for labs for upcoming physical appointment  Call when in is chart

## 2022-11-15 ENCOUNTER — APPOINTMENT (OUTPATIENT)
Dept: LAB | Facility: CLINIC | Age: 62
End: 2022-11-15

## 2022-11-15 DIAGNOSIS — Z12.5 SCREENING FOR PROSTATE CANCER: ICD-10-CM

## 2022-11-15 DIAGNOSIS — E79.0 HYPERURICEMIA: ICD-10-CM

## 2022-11-15 DIAGNOSIS — R68.82 LOW LIBIDO: ICD-10-CM

## 2022-11-15 LAB
ALBUMIN SERPL BCP-MCNC: 3.9 G/DL (ref 3.5–5)
ALP SERPL-CCNC: 71 U/L (ref 46–116)
ALT SERPL W P-5'-P-CCNC: 44 U/L (ref 12–78)
ANION GAP SERPL CALCULATED.3IONS-SCNC: 6 MMOL/L (ref 4–13)
AST SERPL W P-5'-P-CCNC: 23 U/L (ref 5–45)
BASOPHILS # BLD AUTO: 0.05 THOUSANDS/ÂΜL (ref 0–0.1)
BASOPHILS NFR BLD AUTO: 1 % (ref 0–1)
BILIRUB SERPL-MCNC: 0.64 MG/DL (ref 0.2–1)
BUN SERPL-MCNC: 17 MG/DL (ref 5–25)
CALCIUM SERPL-MCNC: 9.1 MG/DL (ref 8.3–10.1)
CHLORIDE SERPL-SCNC: 108 MMOL/L (ref 96–108)
CHOLEST SERPL-MCNC: 155 MG/DL
CO2 SERPL-SCNC: 26 MMOL/L (ref 21–32)
CREAT SERPL-MCNC: 0.87 MG/DL (ref 0.6–1.3)
EOSINOPHIL # BLD AUTO: 0.09 THOUSAND/ÂΜL (ref 0–0.61)
EOSINOPHIL NFR BLD AUTO: 2 % (ref 0–6)
ERYTHROCYTE [DISTWIDTH] IN BLOOD BY AUTOMATED COUNT: 13 % (ref 11.6–15.1)
GFR SERPL CREATININE-BSD FRML MDRD: 91 ML/MIN/1.73SQ M
GLUCOSE P FAST SERPL-MCNC: 99 MG/DL (ref 65–99)
HCT VFR BLD AUTO: 43.2 % (ref 36.5–49.3)
HDLC SERPL-MCNC: 37 MG/DL
HGB BLD-MCNC: 14.6 G/DL (ref 12–17)
IMM GRANULOCYTES # BLD AUTO: 0.02 THOUSAND/UL (ref 0–0.2)
IMM GRANULOCYTES NFR BLD AUTO: 0 % (ref 0–2)
LDLC SERPL CALC-MCNC: 74 MG/DL (ref 0–100)
LYMPHOCYTES # BLD AUTO: 1.7 THOUSANDS/ÂΜL (ref 0.6–4.47)
LYMPHOCYTES NFR BLD AUTO: 31 % (ref 14–44)
MCH RBC QN AUTO: 30.8 PG (ref 26.8–34.3)
MCHC RBC AUTO-ENTMCNC: 33.8 G/DL (ref 31.4–37.4)
MCV RBC AUTO: 91 FL (ref 82–98)
MONOCYTES # BLD AUTO: 0.48 THOUSAND/ÂΜL (ref 0.17–1.22)
MONOCYTES NFR BLD AUTO: 9 % (ref 4–12)
NEUTROPHILS # BLD AUTO: 3.16 THOUSANDS/ÂΜL (ref 1.85–7.62)
NEUTS SEG NFR BLD AUTO: 57 % (ref 43–75)
NONHDLC SERPL-MCNC: 118 MG/DL
NRBC BLD AUTO-RTO: 0 /100 WBCS
PLATELET # BLD AUTO: 177 THOUSANDS/UL (ref 149–390)
PMV BLD AUTO: 11.2 FL (ref 8.9–12.7)
POTASSIUM SERPL-SCNC: 4.5 MMOL/L (ref 3.5–5.3)
PROT SERPL-MCNC: 7.7 G/DL (ref 6.4–8.4)
PSA SERPL-MCNC: 0.7 NG/ML (ref 0–4)
RBC # BLD AUTO: 4.74 MILLION/UL (ref 3.88–5.62)
SODIUM SERPL-SCNC: 140 MMOL/L (ref 135–147)
TRIGL SERPL-MCNC: 218 MG/DL
URATE SERPL-MCNC: 5 MG/DL (ref 3.5–8.5)
WBC # BLD AUTO: 5.5 THOUSAND/UL (ref 4.31–10.16)

## 2022-11-15 PROCEDURE — 36415 COLL VENOUS BLD VENIPUNCTURE: CPT | Performed by: FAMILY MEDICINE

## 2022-11-15 PROCEDURE — 83036 HEMOGLOBIN GLYCOSYLATED A1C: CPT | Performed by: FAMILY MEDICINE

## 2022-11-15 PROCEDURE — 85025 COMPLETE CBC W/AUTO DIFF WBC: CPT | Performed by: FAMILY MEDICINE

## 2022-11-15 PROCEDURE — 80061 LIPID PANEL: CPT | Performed by: FAMILY MEDICINE

## 2022-11-15 PROCEDURE — 80053 COMPREHEN METABOLIC PANEL: CPT | Performed by: FAMILY MEDICINE

## 2022-11-16 LAB
EST. AVERAGE GLUCOSE BLD GHB EST-MCNC: 108 MG/DL
HBA1C MFR BLD: 5.4 %

## 2022-11-21 ENCOUNTER — OFFICE VISIT (OUTPATIENT)
Dept: FAMILY MEDICINE CLINIC | Facility: CLINIC | Age: 62
End: 2022-11-21

## 2022-11-21 VITALS
WEIGHT: 262 LBS | HEART RATE: 60 BPM | SYSTOLIC BLOOD PRESSURE: 132 MMHG | TEMPERATURE: 97.6 F | DIASTOLIC BLOOD PRESSURE: 72 MMHG | RESPIRATION RATE: 16 BRPM | HEIGHT: 74 IN | BODY MASS INDEX: 33.62 KG/M2

## 2022-11-21 DIAGNOSIS — M17.0 PRIMARY OSTEOARTHRITIS OF BOTH KNEES: ICD-10-CM

## 2022-11-21 DIAGNOSIS — G89.29 CHRONIC LEFT-SIDED LOW BACK PAIN WITHOUT SCIATICA: ICD-10-CM

## 2022-11-21 DIAGNOSIS — I25.10 CAD S/P PERCUTANEOUS CORONARY ANGIOPLASTY: ICD-10-CM

## 2022-11-21 DIAGNOSIS — Z98.61 CAD S/P PERCUTANEOUS CORONARY ANGIOPLASTY: ICD-10-CM

## 2022-11-21 DIAGNOSIS — R73.01 IMPAIRED FASTING GLUCOSE: ICD-10-CM

## 2022-11-21 DIAGNOSIS — E78.2 MIXED HYPERLIPIDEMIA: ICD-10-CM

## 2022-11-21 DIAGNOSIS — M54.50 CHRONIC LEFT-SIDED LOW BACK PAIN WITHOUT SCIATICA: ICD-10-CM

## 2022-11-21 DIAGNOSIS — Z00.00 WELL ADULT EXAM: Primary | ICD-10-CM

## 2022-11-21 DIAGNOSIS — I10 ESSENTIAL (PRIMARY) HYPERTENSION: ICD-10-CM

## 2022-11-21 RX ORDER — METHOCARBAMOL 500 MG/1
500 TABLET, FILM COATED ORAL 4 TIMES DAILY PRN
Qty: 30 TABLET | Refills: 0 | Status: SHIPPED | OUTPATIENT
Start: 2022-11-21

## 2022-11-21 RX ORDER — NITROGLYCERIN 0.4 MG/1
0.4 TABLET SUBLINGUAL
Qty: 25 TABLET | Refills: 1 | Status: SHIPPED | OUTPATIENT
Start: 2022-11-21

## 2022-11-21 NOTE — PROGRESS NOTES
Name: Jayy Fuller      : 1960      MRN: [id number]  Encounter Provider: Frederick Boxer, MD  Encounter Date: 2022   Encounter department: [address] [place] [address]     1  Well adult exam    2  Essential (primary) hypertension    3  Mixed hyperlipidemia    4  Impaired fasting glucose    5  CAD S/P percutaneous coronary angioplasty  -     nitroglycerin (NITROSTAT) 0 4 mg SL tablet; Place 1 tablet (0 4 mg total) under the tongue every 5 (five) minutes as needed for chest pain    6  Chronic left-sided low back pain without sciatica  -     methocarbamol (ROBAXIN) 500 mg tablet; Take 1 tablet (500 mg total) by mouth 4 (four) times a day as needed for muscle spasms    7  Primary osteoarthritis of both knees    Continue with current medications  OV 1 year  Orthopedic surgery referral offered he declined for now-waiting until he goes on Medicare    BMI Counseling: Body mass index is 34 1 kg/m²  The BMI is above normal  Nutrition recommendations include reducing portion sizes, decreasing overall calorie intake, consuming healthier snacks, moderation in carbohydrate intake, reducing intake of saturated fat and trans fat and reducing intake of cholesterol  Exercise recommendations include exercising 3-5 times per week  Depression Screening Follow-up Plan: Patient's depression screening was positive with a PHQ-2 score of 3  Their PHQ-9 score was 7  Patient assessed for underlying major depression  They have no active suicidal ideations  Brief counseling provided and recommend additional follow-up/re-evaluation next office visit  Patient's depressive symptoms likely due to other medical condition  Would recommend treatment of underlying condition  Will continue to monitor at next office visit  Patient advised to follow-up with PCP for further management  Subjective     26-year-old male here for Wellness exam   Medications reviewed    Hospitalizations/surgeries/SH/FH reviewed see note  Hypertension blood pressure have been stable on Lisinopril 5 mg daily  11/2022 creatinine 0 87  GFR 91  Electrolytes normal  Hgb 14 6  Hyperlipidemia and CAD  lipid profile 11/2022 Cholesterol 155  Triglycerides 218 increased from 161  HDL 37  LDL 74  LFTs normal  Impaired fasting glucose 11/2021 FBS 99 decreased from 101  A1c 5 4       Recent Results (from the past 672 hour(s))   Hemoglobin A1C    Collection Time: 11/15/22 11:27 AM   Result Value Ref Range    Hemoglobin A1C 5 4 Normal 3 8-5 6%; PreDiabetic 5 7-6 4%;  Diabetic >=6 5%; Glycemic control for adults with diabetes <7 0% %     mg/dl   Comprehensive metabolic panel    Collection Time: 11/15/22 11:27 AM   Result Value Ref Range    Sodium 140 135 - 147 mmol/L    Potassium 4 5 3 5 - 5 3 mmol/L    Chloride 108 96 - 108 mmol/L    CO2 26 21 - 32 mmol/L    ANION GAP 6 4 - 13 mmol/L    BUN 17 5 - 25 mg/dL    Creatinine 0 87 0 60 - 1 30 mg/dL    Glucose, Fasting 99 65 - 99 mg/dL    Calcium 9 1 8 3 - 10 1 mg/dL    AST 23 5 - 45 U/L    ALT 44 12 - 78 U/L    Alkaline Phosphatase 71 46 - 116 U/L    Total Protein 7 7 6 4 - 8 4 g/dL    Albumin 3 9 3 5 - 5 0 g/dL    Total Bilirubin 0 64 0 20 - 1 00 mg/dL    eGFR 91 ml/min/1 73sq m   CBC and differential    Collection Time: 11/15/22 11:27 AM   Result Value Ref Range    WBC 5 50 4 31 - 10 16 Thousand/uL    RBC 4 74 3 88 - 5 62 Million/uL    Hemoglobin 14 6 12 0 - 17 0 g/dL    Hematocrit 43 2 36 5 - 49 3 %    MCV 91 82 - 98 fL    MCH 30 8 26 8 - 34 3 pg    MCHC 33 8 31 4 - 37 4 g/dL    RDW 13 0 11 6 - 15 1 %    MPV 11 2 8 9 - 12 7 fL    Platelets 439 868 - 051 Thousands/uL    nRBC 0 /100 WBCs    Neutrophils Relative 57 43 - 75 %    Immat GRANS % 0 0 - 2 %    Lymphocytes Relative 31 14 - 44 %    Monocytes Relative 9 4 - 12 %    Eosinophils Relative 2 0 - 6 %    Basophils Relative 1 0 - 1 %    Neutrophils Absolute 3 16 1 85 - 7 62 Thousands/µL    Immature Grans Absolute 0 02 0 00 - 0 20 Thousand/uL Lymphocytes Absolute 1 70 0 60 - 4 47 Thousands/µL    Monocytes Absolute 0 48 0 17 - 1 22 Thousand/µL    Eosinophils Absolute 0 09 0 00 - 0 61 Thousand/µL    Basophils Absolute 0 05 0 00 - 0 10 Thousands/µL   Lipid panel    Collection Time: 11/15/22 11:27 AM   Result Value Ref Range    Cholesterol 155 See Comment mg/dL    Triglycerides 218 (H) See Comment mg/dL    HDL, Direct 37 (L) >=40 mg/dL    LDL Calculated 74 0 - 100 mg/dL    Non-HDL-Chol (CHOL-HDL) 118 mg/dl   Uric acid    Collection Time: 11/15/22 11:27 AM   Result Value Ref Range    Uric Acid 5 0 3 5 - 8 5 mg/dL   PSA, Total Screen    Collection Time: 11/15/22 11:27 AM   Result Value Ref Range    PSA 0 7 0 0 - 4 0 ng/mL               Review of Systems   Constitutional: Positive for unexpected weight change (25 lb weight gain from 11/2021)  Negative for appetite change, chills and fever  HENT: Negative for congestion, ear pain, rhinorrhea, sore throat and trouble swallowing  Eyes: Negative for visual disturbance  Respiratory: Negative for cough, shortness of breath and wheezing  Prior COV 19 infection  Cardiovascular: Negative for chest pain, palpitations and leg swelling  See HPI  status post stent 02/2015   10/2020 stress echocardiogram normal no evidence of ischemia  Ejection fraction 60-65%  Mild mitral regurgitation  Trace aortic insufficiency  Gastrointestinal: Negative for abdominal pain, blood in stool, constipation, diarrhea, nausea and vomiting  01/2019 colonoscopy   Endocrine: Negative for polydipsia and polyuria  Genitourinary: Negative for difficulty urinating         + ED uses prn Cialis  Lab Results       Component                Value               Date                       PSA                      0 7                 11/15/2022                 PSA                      0 5                 11/09/2021                          Musculoskeletal: Positive for arthralgias and back pain   Negative for myalgias  OA knees limiting his activity level  Prior Ortho evaluation "bone on bone" per patient Chronic recurrent L sided lower back pain  No radicular pain  Hyperuricemia on Allopurinol 300 mg daily      Lab Results       Component                Value               Date                       URICACID                 5 0                 11/15/2022               Skin: Negative for rash  Allergic/Immunologic: Negative for environmental allergies  Neurological: Negative for dizziness and headaches  Hematological: Negative for adenopathy  Does not bruise/bleed easily  Psychiatric/Behavioral: Negative for dysphoric mood and sleep disturbance  The patient is not nervous/anxious  Past Medical History:   Diagnosis Date   • CAD (coronary artery disease)     s/p stents   • Chronic idiopathic gout 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 tophus 3/18/2015   • Hyperlipidemia    • Hypertension      Past Surgical History:   Procedure Laterality Date   • CORONARY ANGIOPLASTY WITH STENT PLACEMENT     • HEMORROIDECTOMY     • HERNIA REPAIR     • KS COLONOSCOPY FLX DX W/COLLJ SPEC WHEN PFRMD N/A 2019    Procedure: COLONOSCOPY;  Surgeon: Josh Meyers MD;  Location: [facility];   Service: Colorectal     Family History   Problem Relation Age of Onset   • Coronary artery disease Mother      Social History     Socioeconomic History   • Marital status: /Civil Union     Spouse name: None   • Number of children: None   • Years of education: None   • Highest education level: None   Occupational History   • None   Tobacco Use   • Smoking status: Former     Packs/day: 3 00     Years: 10 00     Pack years: 30 00     Types: Cigarettes     Quit date: 1989     Years since quittin 8   • Smokeless tobacco: Never   Substance and Sexual Activity   • Alcohol use: Yes     Comment: 1 per week   • Drug use: No   • Sexual activity: Yes     Partners: Female     Birth control/protection: None   Other Topics Concern   • None   Social History Narrative   • None     Social Determinants of Health     Financial Resource Strain: Not on file   Food Insecurity: Not on file   Transportation Needs: Not on file   Physical Activity: Not on file   Stress: Not on file   Social Connections: Not on file   Intimate Partner Violence: Not on file   Housing Stability: Not on file     Current Outpatient Medications on File Prior to Visit   Medication Sig   • allopurinol (ZYLOPRIM) 300 mg tablet Take 1 tablet (300 mg total) by mouth daily   • aspirin 81 MG tablet Take 81 mg by mouth daily   • Cholecalciferol 1000 units capsule Take 1 capsule by mouth daily   • lisinopril (ZESTRIL) 5 mg tablet Take 1 tablet (5 mg total) by mouth daily   • Multiple Vitamins-Minerals (MENS 50+ MULTI VITAMIN/MIN PO) Take 1 tablet by mouth every morning   • simvastatin (ZOCOR) 40 mg tablet Take 1 tablet (40 mg total) by mouth daily   • tadalafil (CIALIS) 20 MG tablet Take 1 tablet (20 mg total) by mouth daily as needed for erectile dysfunction   • [DISCONTINUED] nitroglycerin (NITROSTAT) 0 4 mg SL tablet Place 1 tablet (0 4 mg total) under the tongue every 5 (five) minutes as needed for chest pain   • [DISCONTINUED] DOCOSAHEXAENOIC ACID-EPA PO Take 1 capsule by mouth every morning     No Known Allergies  Immunization History   Administered Date(s) Administered   • COVID-19 PFIZER VACCINE 0 3 ML IM 05/27/2021, 06/22/2021   • COVID-19 Pfizer vac (Piter-sucrose, gray cap) 12 yr+ IM 01/27/2022   • Tdap 02/01/2017       Objective     /72   Pulse 60   Temp 97 6 °F (36 4 °C)   Resp 16   Ht 6' 1 5" (1 867 m)   Wt 119 kg (262 lb)   BMI 34 10 kg/m²     BP Readings from Last 3 Encounters:   11/21/22 132/72   11/17/21 118/82   01/23/20 122/82     Wt Readings from Last 3 Encounters:   11/21/22 119 kg (262 lb)   11/17/21 108 kg (237 lb)   01/23/20 109 kg (239 lb 8 oz)         Physical Exam  Vitals and nursing note reviewed     Constitutional:       General: He is not in acute distress  Appearance: He is well-developed  HENT:      Right Ear: Tympanic membrane and ear canal normal       Left Ear: Tympanic membrane and ear canal normal       Mouth/Throat:      Mouth: No oral lesions  Pharynx: Oropharynx is clear  Eyes:      General: No scleral icterus  Extraocular Movements: Extraocular movements intact  Conjunctiva/sclera: Conjunctivae normal       Pupils: Pupils are equal, round, and reactive to light  Neck:      Thyroid: No thyroid mass or thyromegaly  Vascular: No carotid bruit or JVD  Trachea: No tracheal deviation  Cardiovascular:      Rate and Rhythm: Normal rate and regular rhythm  Pulses:           Carotid pulses are 2+ on the right side and 2+ on the left side  Heart sounds: Normal heart sounds  No murmur heard  No gallop  Pulmonary:      Effort: Pulmonary effort is normal  No respiratory distress  Breath sounds: Normal breath sounds  No wheezing or rales  Abdominal:      General: Bowel sounds are normal  There is no distension or abdominal bruit  Palpations: Abdomen is soft  There is no hepatomegaly, splenomegaly or mass  Tenderness: There is no abdominal tenderness  There is no guarding or rebound  Musculoskeletal:      Right knee: Crepitus present  No bony tenderness  No tenderness  Left knee: Crepitus present  No bony tenderness  Decreased range of motion  No tenderness  Right lower leg: No edema  Left lower leg: No edema  Lymphadenopathy:      Cervical: No cervical adenopathy  Upper Body:      Right upper body: No supraclavicular adenopathy  Left upper body: No supraclavicular adenopathy  Skin:     Findings: No rash  Nails: There is no clubbing  Neurological:      General: No focal deficit present  Mental Status: He is alert and oriented to person, place, and time     Psychiatric:         Mood and Affect: Mood normal          Behavior: Behavior normal  Thought Content:  Thought content normal          Judgment: Judgment normal        Rudy Mortensen MD

## 2023-03-15 DIAGNOSIS — E78.00 HYPERCHOLESTEREMIA: ICD-10-CM

## 2023-03-15 DIAGNOSIS — E79.0 HYPERURICEMIA: ICD-10-CM

## 2023-03-15 RX ORDER — SIMVASTATIN 40 MG
40 TABLET ORAL DAILY
Qty: 90 TABLET | Refills: 0 | Status: SHIPPED | OUTPATIENT
Start: 2023-03-15

## 2023-03-15 RX ORDER — ALLOPURINOL 300 MG/1
300 TABLET ORAL DAILY
Qty: 90 TABLET | Refills: 3 | Status: SHIPPED | OUTPATIENT
Start: 2023-03-15

## 2023-04-27 ENCOUNTER — EVALUATION (OUTPATIENT)
Dept: PHYSICAL THERAPY | Facility: CLINIC | Age: 63
End: 2023-04-27

## 2023-04-27 DIAGNOSIS — M47.896 OTHER SPONDYLOSIS, LUMBAR REGION: Primary | ICD-10-CM

## 2023-04-27 RX ORDER — CELECOXIB 100 MG/1
100 CAPSULE ORAL 2 TIMES DAILY
COMMUNITY

## 2023-04-27 NOTE — PROGRESS NOTES
PT Evaluation     Today's date: 2023  Patient name: Martha Curiel  : 1960  MRN: [id number]  Referring provider: Dixie Jimenez DO  Dx:   Encounter Diagnosis     ICD-10-CM    1  Other spondylosis, lumbar region  M47 896           Start Time: 1115  Stop Time: 1200  Total time in clinic (min): 45 minutes    Assessment  Assessment details: Martha Curiel is a pleasant 70 y o  male who presents with persistent LBP with left LE radiculopathy  The patient's greatest concerns are the pain he is experiencing, concern at no signs of improvement and fear of not being able to keep active  No further referral appears necessary at this time based upon examination results  Primary movement impairment diagnosis of increased neural tension in the left LE resulting in pathoanatomical symptoms of pain in the low back and left posterior LE and limiting his ability to exercise or recreation, lift, perform yard work, sit, stand and walk  Primary Impairments:  1) Decreased lumbar segmental mobility   2) Decreased hip mobility   3) Impaired core stabilization and control     Etiologic factors include repetitive poor body mechanics  Impairments: abnormal or restricted ROM, activity intolerance, impaired physical strength, lacks appropriate home exercise program, pain with function and poor posture     Symptom irritability: lowUnderstanding of Dx/Px/POC: good   Prognosis: good  Prognosis details: Positive prognostic indicators include positive attitude toward recovery and good understanding of diagnosis and treatment plan options  Negative prognostic indicators include chronicity of symptoms, degree of peripheralization and multiple concurrent orthopedic problems  Goals  Patient will be independent with home exercise program    Patient will be able to manage symptoms independently  Short Term Goals 2-4 wks   1   Pt will be independent with initial HEP   2  Pt will decrease pain in the left LE to < 2/10 at worst   3  Pt will improve lumbar AROM to WNL and pain free     Long Term Goals 6-8 wks  1  Pt will improve L-spine segmental mobility to WNL and pain free   2  Pt will report standing/sitting tolerance >30 minutes   3  Pt will improve FOTO score to >76 by d/c      Plan  Patient would benefit from: skilled physical therapy  Planned modality interventions: Modalities PRN  Planned therapy interventions: activity modification, manual therapy, neuromuscular re-education, patient education, therapeutic activities, therapeutic exercise, graded activity, home exercise program, behavior modification, self care, joint mobilization and abdominal trunk stabilization  Frequency: 2x week  Duration in weeks: 6  Treatment plan discussed with: patient        Subjective Evaluation    History of Present Illness  Mechanism of injury: Lindsey Taylor presents with c/c of chronic low back pain and left LE radiculopathy  Pt reports flare up last fall when he was putting a chair together and had increased pain in the low back and left LE  Symptoms lasted for about 3 wks then resolved with some exercise  Pt had similar instance into the left LE a few weeks later which also resolved  Pt recently saw ortho for injections into the knees and was referred to OPPT of current concerns of pain into the left LE  Reports improvement of symptoms in B/L knees following cortisone injections last week   Hx of OA in B/L knees which limits weight bearing activities   Pain location: left buttock and and posterior LE, descriptors: sharp   Aggravating factors: sitting for long period of time, standing for extended period of time, getting in and out of truck/car  Relieving factors: change in position   24hr pain pattern: 1/10 (current), 0/10 (best), 5/10 (worst)   Imaging: x-rays (no significant osseous injury, mild OA, stenosis)   Previous treatments: medication   Occupation/recreation: retired, goes to [employer] 3x/wk   Sleeping: no disturbed sleep, primarily side sleeper   Patient concerns: decreasing pain in the left LE, remaining active, losing weight     Cancer:  Family history: No  Personal history: No  Recent weight loss: No  Night pain: No    Infection:  Recent history of infection: No  Recent or current fever: No    Fracture:  Recent history of trauma: No  History of corticosteroid use: No  History of bone density disorders: No    Cauda Equina:  Recent change in bowel or bladder control: No  Presence of numbness or saddle anesthesia: No  Leg weakness or difficulty walking: No          Objective     Postural Observations  Seated posture: poor  Standing posture: fair  Correction of posture: makes symptoms better        Tenderness     Lumbar Spine  No tenderness in the spinous process, left transverse process and right transverse process  Left Hip   No tenderness in the PSIS  Right Hip   No tenderness in the PSIS  Neurological Testing     Sensation     Lumbar   Left   Intact: light touch    Right   Intact: light touch    Reflexes   Left   Patellar (L4): normal (2+)  Achilles (S1): normal (2+)    Right   Patellar (L4): normal (2+)  Achilles (S1): normal (2+)    Active Range of Motion     Lumbar   Flexion:  WFL  Extension:  Restriction level: moderate  Left lateral flexion:  Restriction level: moderate  Right lateral flexion:  Restriction level: moderate  Left rotation:  Restriction level: minimal  Right rotation:  Restriction level: minimal    Joint Play     Hypomobile: L1, L2, L3, L4, L5 and S1     Strength/Myotome Testing     Lumbar   Left   Normal strength    Right   Normal strength    Tests     Lumbar   Negative sacral spring   Left   Negative crossed SLR, passive SLR, sural bias, tibial bias and slump test      Right   Negative crossed SLR, passive SLR and slump test      Left Knee   Negative peroneal nerve tension       General Comments:      Hip Comments   Hamstring length equal B/L, approx 75-80*   Hip IR decreased B/L, 15-20* Flowsheet Rows    Flowsheet Row Most Recent Value   PT/OT G-Codes    Current Score 73   Projected Score 76             Precautions: N/A    Access Code: [email or web address]    Manuals 4/27            L/S PA Josie Gr1-2                                                     Neuro Re-Ed             PPT             TA Brace              Sciatic N [place]             Bridge              Clamshell                           Education  S/S, POC, HEP            Ther Ex             Active Warm Up              LTR w/ PBall              LE Self Stretch                                        Ther Activity             Squat              Hinge              Lunge              Step Up                          Gait Training                                       Modalities

## 2023-04-27 NOTE — LETTER
2023    Jessica Wood DO  [address] [address] [address] [facility]    Patient: Indra Bajwa   YOB: 1960   Date of Visit: 2023     Encounter Diagnosis     ICD-10-CM    1  Other spondylosis, lumbar region  M47 896           Dear Dr Rodrigo Bedoya: Thank you for your recent referral of Indra Bajwa  Please review the attached evaluation summary from Rupesh's recent visit  Please verify that you agree with the plan of care by signing the attached order  If you have any questions or concerns, please do not hesitate to call  I sincerely appreciate the opportunity to share in the care of one of your patients and hope to have another opportunity to work with you in the near future  Sincerely,    Radha Rothman, PT      Referring Provider:      I certify that I have read the below Plan of Care and certify the need for these services furnished under this plan of treatment while under my care  Jessica Wood DO  [address] [place], [place] [address] [address]  Via Fax: [phone]          PT Evaluation     Today's date: 2023  Patient name: Indra Bajwa  : 1960  MRN: [id number]  Referring provider: Jovani Souza DO  Dx:   Encounter Diagnosis     ICD-10-CM    1  Other spondylosis, lumbar region  M47 896           Start Time: 1115  Stop Time: 1200  Total time in clinic (min): 45 minutes    Assessment  Assessment details: Indra Bajwa is a pleasant 70 y o  male who presents with persistent LBP with left LE radiculopathy  The patient's greatest concerns are the pain he is experiencing, concern at no signs of improvement and fear of not being able to keep active  No further referral appears necessary at this time based upon examination results      Primary movement impairment diagnosis of increased neural tension in the left LE resulting in pathoanatomical symptoms of pain in the low back and left posterior LE and limiting his ability to exercise or recreation, lift, perform yard work, sit, stand and walk  Primary Impairments:  1) Decreased lumbar segmental mobility   2) Decreased hip mobility   3) Impaired core stabilization and control     Etiologic factors include repetitive poor body mechanics  Impairments: abnormal or restricted ROM, activity intolerance, impaired physical strength, lacks appropriate home exercise program, pain with function and poor posture     Symptom irritability: lowUnderstanding of Dx/Px/POC: good   Prognosis: good  Prognosis details: Positive prognostic indicators include positive attitude toward recovery and good understanding of diagnosis and treatment plan options  Negative prognostic indicators include chronicity of symptoms, degree of peripheralization and multiple concurrent orthopedic problems  Goals  Patient will be independent with home exercise program    Patient will be able to manage symptoms independently  Short Term Goals 2-4 wks   1  Pt will be independent with initial HEP   2  Pt will decrease pain in the left LE to < 2/10 at worst   3  Pt will improve lumbar AROM to WNL and pain free     Long Term Goals 6-8 wks  1  Pt will improve L-spine segmental mobility to WNL and pain free   2  Pt will report standing/sitting tolerance >30 minutes   3  Pt will improve FOTO score to >76 by d/c      Plan  Patient would benefit from: skilled physical therapy  Planned modality interventions: Modalities PRN    Planned therapy interventions: activity modification, manual therapy, neuromuscular re-education, patient education, therapeutic activities, therapeutic exercise, graded activity, home exercise program, behavior modification, self care, joint mobilization and abdominal trunk stabilization  Frequency: 2x week  Duration in weeks: 6  Treatment plan discussed with: patient        Subjective Evaluation    History of Present Illness  Mechanism of injury: Whit Ashby presents with c/c of chronic low back pain and left LE radiculopathy  Pt reports flare up last fall when he was putting a chair together and had increased pain in the low back and left LE  Symptoms lasted for about 3 wks then resolved with some exercise  Pt had similar instance into the left LE a few weeks later which also resolved  Pt recently saw ortho for injections into the knees and was referred to OPPT of current concerns of pain into the left LE  Reports improvement of symptoms in B/L knees following cortisone injections last week  Hx of OA in B/L knees which limits weight bearing activities   Pain location: left buttock and and posterior LE, descriptors: sharp   Aggravating factors: sitting for long period of time, standing for extended period of time, getting in and out of truck/car  Relieving factors: change in position   24hr pain pattern: 1/10 (current), 0/10 (best), 5/10 (worst)   Imaging: x-rays (no significant osseous injury, mild OA, stenosis)   Previous treatments: medication   Occupation/recreation: retired, goes to [employer] 3x/wk   Sleeping: no disturbed sleep, primarily side sleeper   Patient concerns: decreasing pain in the left LE, remaining active, losing weight     Cancer:  Family history: No  Personal history: No  Recent weight loss: No  Night pain: No    Infection:  Recent history of infection: No  Recent or current fever: No    Fracture:  Recent history of trauma: No  History of corticosteroid use: No  History of bone density disorders: No    Cauda Equina:  Recent change in bowel or bladder control: No  Presence of numbness or saddle anesthesia: No  Leg weakness or difficulty walking: No          Objective     Postural Observations  Seated posture: poor  Standing posture: fair  Correction of posture: makes symptoms better        Tenderness     Lumbar Spine  No tenderness in the spinous process, left transverse process and right transverse process  Left Hip   No tenderness in the PSIS       Right Hip No tenderness in the PSIS  Neurological Testing     Sensation     Lumbar   Left   Intact: light touch    Right   Intact: light touch    Reflexes   Left   Patellar (L4): normal (2+)  Achilles (S1): normal (2+)    Right   Patellar (L4): normal (2+)  Achilles (S1): normal (2+)    Active Range of Motion     Lumbar   Flexion:  WFL  Extension:  Restriction level: moderate  Left lateral flexion:  Restriction level: moderate  Right lateral flexion:  Restriction level: moderate  Left rotation:  Restriction level: minimal  Right rotation:  Restriction level: minimal    Joint Play     Hypomobile: L1, L2, L3, L4, L5 and S1     Strength/Myotome Testing     Lumbar   Left   Normal strength    Right   Normal strength    Tests     Lumbar   Negative sacral spring   Left   Negative crossed SLR, passive SLR, sural bias, tibial bias and slump test      Right   Negative crossed SLR, passive SLR and slump test      Left Knee   Negative peroneal nerve tension       General Comments:      Hip Comments   Hamstring length equal B/L, approx 75-80*   Hip IR decreased B/L, 15-20*       Flowsheet Rows    Flowsheet Row Most Recent Value   PT/OT G-Codes    Current Score 73   Projected Score 76            Precautions: N/A    Access Code: [email or web address]    Manuals 4/27            L/S JAMAL Galindo Gr1-2                                                     Neuro Re-Ed             PPT             TA Brace              Sciatic N [place]             Bridge              [facility]                           Education  S/S, POC, HEP            Ther Ex             Active Warm Up              LTR w/ PBall              LE Self Stretch                                        Ther Activity             Squat              Hinge              Lunge              Step Up                          Gait Training                                       Modalities

## 2023-05-02 ENCOUNTER — OFFICE VISIT (OUTPATIENT)
Dept: PHYSICAL THERAPY | Facility: CLINIC | Age: 63
End: 2023-05-02

## 2023-05-02 DIAGNOSIS — M47.896 OTHER SPONDYLOSIS, LUMBAR REGION: Primary | ICD-10-CM

## 2023-05-02 NOTE — PROGRESS NOTES
"Daily Note     Today's date: 2023  Patient name: Nini Proctor  : 1960  MRN: [id number]  Referring provider: Lorene Colindres DO  Dx:   Encounter Diagnosis     ICD-10-CM    1  Other spondylosis, lumbar region  M47 896                      Subjective: Pt reports no adverse effects w/ current HEP  Reports no current back pain or radicular sx  However, pt c/o L knee discomfort when walking, and was wondering if that was \"coming from his ITB\"  Objective: See treatment diary below      Assessment: Tolerated treatment well  Patient would benefit from continued PT  Mild R LE discomfort noted w/ BKFO on R  No lumbar discomfort noted w/ TE performed  No TTP noted w/ STM to lumbar musculature  Plan: Progress treatment as tolerated         Precautions: N/A    Access Code: [email or web address]    Manuals  5/2           L/S PA Mobs Gr1-2   STM- LM                                                  Neuro Re-Ed             PPT  10x3\"           TA Brace   10x5\"           Sciatic N Glide  2x10 L supine           Bridge   2x10           Clamshell   2x10 ea           Supine march w/ TA             Supine BKFO w/ TA  10 ea           Education  S/S, POC, HEP            Ther Ex             Active Warm Up   5' lv 4 bike           LTR w/ PBall   10, 3\" ea           LE Self Stretch                                        Ther Activity             Squat   2x10           Hinge              Lunge              Step Up  8\"x10 ea                        Gait Training                                       Modalities                                            "

## 2023-05-05 ENCOUNTER — OFFICE VISIT (OUTPATIENT)
Dept: PHYSICAL THERAPY | Facility: CLINIC | Age: 63
End: 2023-05-05

## 2023-05-05 DIAGNOSIS — M47.896 OTHER SPONDYLOSIS, LUMBAR REGION: Primary | ICD-10-CM

## 2023-05-05 NOTE — PROGRESS NOTES
"Daily Note     Today's date: 2023  Patient name: Anil Smith  : 1960  MRN: [id number]  Referring provider: Reji Malloy DO  Dx:   Encounter Diagnosis     ICD-10-CM    1  Other spondylosis, lumbar region  M47 896                      Subjective: Pt reports he has some soreness in the right low back after going to the gym and doing some grocery shopping on Wednesday  Objective: See treatment diary below      Assessment: Tolerated treatment well  Continued with current POC with good tolerance from the patient  No pain reported with exercises  Decreased soreness reported post tx  Continue to progress as tolerated  Patient would benefit from continued PT      Plan: Continue per plan of care        Precautions: N/A    Access Code: [email or web address]    Manuals         L/S PA Mobs Gr1-2   STM- LM KCB                                         Neuro Re-Ed           PPT  10x3\" 20x3\"         TA Brace   10x5\" 20x3\"        Sciatic N Glide  2x10 L supine         Bridge   2x10 2x10         Clamshell   2x10 ea 20x GTB        Supine march w/ TA           Supine BKFO w/ TA  10 ea                    Education  S/S, POC, HEP          Ther Ex           Active Warm Up   5' lv 4 bike Upright bike L4 8'        LTR w/ PBall   10, 3\" ea 20x3\"         LE Self Stretch                                  Ther Activity           Squat   2x10         Hinge            Lunge            Step Up  8\"x10 ea                    Gait Training                                 Modalities                                      "

## 2023-05-09 ENCOUNTER — OFFICE VISIT (OUTPATIENT)
Dept: PHYSICAL THERAPY | Facility: CLINIC | Age: 63
End: 2023-05-09

## 2023-05-09 DIAGNOSIS — M47.896 OTHER SPONDYLOSIS, LUMBAR REGION: Primary | ICD-10-CM

## 2023-05-09 NOTE — PROGRESS NOTES
"Daily Note     Today's date: 2023  Patient name: Monica Mistry  : 1960  MRN: [id number]  Referring provider: Radha Mc DO  Dx:   Encounter Diagnosis     ICD-10-CM    1  Other spondylosis, lumbar region  M47 896           Start Time: 1105  Stop Time: 1150  Total time in clinic (min): 45 minutes    Subjective: Pt reports he still has some soreness in the left low back  Reports feeling \"pretty good\" following the previous PT session  Objective: See treatment diary below      Assessment: Tolerated treatment well  Continued with current POC with good tolerance from the patient  No pain reported in the low back with exercises  Occasional cuing required for proper exercise technique  Progress as tolerated  Patient would benefit from continued PT      Plan: Continue per plan of care        Precautions: N/A    Access Code: [email or web address]    Manuals  5       L/S PA Mobs Gr1-2   STM- LM KCB KCB                                        Neuro Re-Ed           PPT  10x3\" 20x3\"  20x3\"        TA Brace   10x5\" 20x3\" 20x3\"        Sciatic N Glide  2x10 L supine         Bridge   2x10 2x10  2x10        Clamshell   2x10 ea 20x GTB 20x GTB       Supine march w/ TA           Supine BKFO w/ TA  10 ea                    Education  S/S, POC, HEP          Ther Ex           Active Warm Up   5' lv 4 bike Upright bike L4 8' Upright bike L4 6'       LTR w/ PBall   10, 3\" ea 20x3\"  20x3\"        LE Self Stretch                                  Ther Activity           Squat   2x10  2x10        Hinge            Lunge            Step Up  8\"x10 ea  2x10 8\" step                  Gait Training                                 Modalities                                      "

## 2023-05-12 ENCOUNTER — OFFICE VISIT (OUTPATIENT)
Dept: PHYSICAL THERAPY | Facility: CLINIC | Age: 63
End: 2023-05-12

## 2023-05-12 DIAGNOSIS — M47.896 OTHER SPONDYLOSIS, LUMBAR REGION: Primary | ICD-10-CM

## 2023-05-12 NOTE — PROGRESS NOTES
"Daily Note     Today's date: 2023  Patient name: Maximino Franklin  : 1960  MRN: [id number]  Referring provider: Siddhartha Devi DO  Dx:   Encounter Diagnosis     ICD-10-CM    1  Other spondylosis, lumbar region  M47 896           Start Time: 1057        Subjective: Patient offers no new complaints to begin session  Previous L lower back soreness generally resolved  Objective: See treatment diary below    Assessment: Tolerated treatment well without complaints of pain  Intermittent VCs for form maintenance with good carryover  Patient would benefit from continued PT to address remaining deficits  Continue nv as able per patient tolerance  Plan: Continue per plan of care        Precautions: N/A    Access Code: [facility]       L/S PA Mobs Gr1-2   STM- LM KCB KCB                                        Neuro Re-Ed           PPT  10x3\" 20x3\"  20x3\"  20x3\"      TA Brace   10x5\" 20x3\" 20x3\"  20x3\"      Sciatic N Glide  2x10 L supine         Bridge   2x10 2x10  2x10  2x10      Clamshell   2x10 ea 20x GTB 20x GTB 20x ea, GTB      Supine march w/ TA           Supine BKFO w/ TA  10 ea                    Education  S/S, POC, HEP          Ther Ex           Active Warm Up   5' lv 4 bike Upright bike L4 8' Upright bike L4 6' Upright L4 8'      LTR w/ PBall   10, 3\" ea 20x3\"  20x3\"  20x3\"      LE Self Stretch                                  Ther Activity           Squat   2x10  2x10  2x10      Hinge            Lunge            Step Up  8\"x10 ea  2x10 8\" step 8\" 2x10                 Gait Training                                 Modalities                                      "

## 2023-05-16 ENCOUNTER — OFFICE VISIT (OUTPATIENT)
Dept: PHYSICAL THERAPY | Facility: CLINIC | Age: 63
End: 2023-05-16

## 2023-05-16 DIAGNOSIS — M47.896 OTHER SPONDYLOSIS, LUMBAR REGION: Primary | ICD-10-CM

## 2023-05-16 NOTE — PROGRESS NOTES
"Daily Note     Today's date: 2023  Patient name: Monica Mistry  : 1960  MRN: [id number]  Referring provider: Radha Mc DO  Dx:   Encounter Diagnosis     ICD-10-CM    1  Other spondylosis, lumbar region  M47 896           Start Time: 1100  Stop Time: 1145  Total time in clinic (min): 45 minutes    Subjective: Pt reports he felt pretty good last week and was able to do more activity  On  when getting out of bed, he experienced a sharp pain in the right low back  This eventually resolved after using medication and going in the hot tub  Objective: See treatment diary below      Assessment: Tolerated treatment well  Continued with current POC with good tolerance from the patient  No pain reported with exercises  Occasional cuing required for proper exercise technique  Continue to progress as tolerated  Patient would benefit from continued PT      Plan: Continue per plan of care        Precautions: N/A    Access Code: [facility]      L/S PA Mobs Gr1-2   STM- LM KCB KCB LH KCB                                      Neuro Re-Ed           PPT  10x3\" 20x3\"  20x3\"  20x3\" 20x3\"      TA Brace   10x5\" 20x3\" 20x3\"  20x3\" 20x3\"     Sciatic N Glide  2x10 L supine         Bridge   2x10 2x10  2x10  2x10 2x10     Clamshell   2x10 ea 20x GTB 20x GTB 20x ea, GTB 20x GTB     Supine march w/ TA           Supine BKFO w/ TA  10 ea                    Education  S/S, POC, HEP          Ther Ex           Active Warm Up   5' lv 4 bike Upright bike L4 8' Upright bike L4 6' Upright L4 8' Upright bike L4 6'     LTR w/ PBall   10, 3\" ea 20x3\"  20x3\"  20x3\" 20x3\"     Hip Add Iso      20x3\"     Hip Abd Iso       20x3\"                Ther Activity           Squat   2x10  2x10  2x10      Hinge            Lunge            Step Up  8\"x10 ea  2x10 8\" step 8\" 2x10                 Gait Training                                 Modalities                                      "

## 2023-05-19 ENCOUNTER — OFFICE VISIT (OUTPATIENT)
Dept: PHYSICAL THERAPY | Facility: CLINIC | Age: 63
End: 2023-05-19

## 2023-05-19 DIAGNOSIS — M47.896 OTHER SPONDYLOSIS, LUMBAR REGION: Primary | ICD-10-CM

## 2023-05-19 NOTE — PROGRESS NOTES
"Daily Note     Today's date: 2023  Patient name: Melina Zhang  : 1960  MRN: [id number]  Referring provider: Kurtis Gant DO  Dx:   Encounter Diagnosis     ICD-10-CM    1  Other spondylosis, lumbar region  M47 896                      Subjective: Pt states he irritated his back getting out of bed the day  Objective: See treatment diary below      Assessment: Tolerated treatment well  Patient exhibited good technique with therapeutic exercises and would benefit from continued PT  Cues needed w/ PPT for proper technique  Good tolerance to progression of core exercises  No adverse effects noted w/ manual techniques  Plan: Progress treatment as tolerated         Precautions: N/A    Access Code: [facility] AT [facility]     L/S PA Mobs Gr1-2   STM- LM KCB KCB LH KCB LM ([facility])                                     Neuro Re-Ed           PPT  10x3\" 20x3\"  20x3\"  20x3\" 20x3\"  20x3\"    TA Brace   10x5\" 20x3\" 20x3\"  20x3\" 20x3\" 20x3\" w/ pball    Sciatic N Glide  2x10 L supine         Bridge   2x10 2x10  2x10  2x10 2x10 2x10    Clamshell   2x10 ea 20x GTB 20x GTB 20x ea, GTB 20x GTB nv    Supine march w/ TA       10 ea; up/up/down/don 10 ea    Supine BKFO w/ TA  10 ea         Dead bugs       NV                                     Education  S/S, POC, HEP          Ther Ex           Active Warm Up   5' lv 4 bike Upright bike L4 8' Upright bike L4 6' Upright L4 8' Upright bike L4 6' Upright bike 6' lv 4    LTR w/ PBall   10, 3\" ea 20x3\"  20x3\"  20x3\" 20x3\" 20x3\"    Hip Add Iso      20x3\" 20x3\"    Hip Abd Iso       20x3\" 20x3\"               Ther Activity           Squat   2x10  2x10  2x10      Hinge            Lunge            Step Up  8\"x10 ea  2x10 8\" step 8\" 2x10                 Gait Training                                 Modalities                                      "

## 2023-05-23 ENCOUNTER — OFFICE VISIT (OUTPATIENT)
Dept: PHYSICAL THERAPY | Facility: CLINIC | Age: 63
End: 2023-05-23

## 2023-05-23 DIAGNOSIS — M47.896 OTHER SPONDYLOSIS, LUMBAR REGION: Primary | ICD-10-CM

## 2023-05-23 NOTE — PROGRESS NOTES
"Daily Note     Today's date: 2023  Patient name: Tea Herr  : 1960  MRN: [id number]  Referring provider: Areli Calderón DO  Dx:   Encounter Diagnosis     ICD-10-CM    1  Other spondylosis, lumbar region  M47 896           Start Time: 1100  Stop Time: 1150  Total time in clinic (min): 50 minutes    Subjective: Pt with no new concerns noted at this time  No pain reported at the start of treatment  Objective: See treatment diary below      Assessment: Tolerated treatment well  Continued with current POC with good tolerance from the patient  No pain reported with exercises  Occasional cuing required for proper exercise technique  Continue to progress as tolerated  Patient would benefit from continued PT      Plan: Continue per plan of care        Precautions: N/A    Access Code: [facility] AT [facility]    L/S PA Mobs Gr1-2   STM- LM KCB KCB LH KCB LM ([facility]) KCB                                    Neuro Re-Ed           PPT  10x3\" 20x3\"  20x3\"  20x3\" 20x3\"  20x3\" 20x3\"    TA Brace   10x5\" 20x3\" 20x3\"  20x3\" 20x3\" 20x3\" w/ pball 20x3\" w/ pball   Sciatic N Glide  2x10 L supine         Bridge   2x10 2x10  2x10  2x10 2x10 2x10 2x10   Clamshell   2x10 ea 20x GTB 20x GTB 20x ea, GTB 20x GTB nv    Supine march w/ TA       10 ea; up/up/down/don 10 ea LE taps   2x10 w/ pball   Supine BKFO w/ TA  10 ea         Dead bugs       NV LE taps   2x10 w/ pball                                    Education  S/S, POC, HEP          Ther Ex           Active Warm Up   5' lv 4 bike Upright bike L4 8' Upright bike L4 6' Upright L4 8' Upright bike L4 6' Upright bike 6' lv 4 Upright bike L4 6'   LTR w/ PBall   10, 3\" ea 20x3\"  20x3\"  20x3\" 20x3\" 20x3\" 20x3\"   Hip Add Iso      20x3\" 20x3\" 20x3\"   Hip Abd Iso       20x3\" 20x3\" 20x3\"              Ther Activity           Squat   2x10  2x10  2x10      Hinge            Lunge            Step Up  8\"x10 ea  2x10 8\" step 8\" 2x10             " Gait Training                                 Modalities

## 2023-05-26 ENCOUNTER — OFFICE VISIT (OUTPATIENT)
Dept: PHYSICAL THERAPY | Facility: CLINIC | Age: 63
End: 2023-05-26

## 2023-05-26 DIAGNOSIS — M47.896 OTHER SPONDYLOSIS, LUMBAR REGION: Primary | ICD-10-CM

## 2023-05-26 NOTE — PROGRESS NOTES
"Daily Note     Today's date: 2023  Patient name: Julia Euceda  : 1960  MRN: [id number]  Referring provider: Analisa Garsia DO  Dx:   Encounter Diagnosis     ICD-10-CM    1  Other spondylosis, lumbar region  M47 896                      Subjective: Pt states he felt \"good\" following last visit  Objective: See treatment diary below      Assessment: Tolerated treatment well  Patient exhibited good technique with therapeutic exercises and would benefit from continued PT  No increased sx noted w/ TE performed  Good tolerance to manual techniques; resume mobes nv  Plan: Progress treatment as tolerated         Precautions: N/A    Access Code: [facility] AT [facility]    Manuals    L/S PA Mobs Gr1-2  LM ([facility])    LH KCB LM ([facility]) KCB                                    Neuro Re-Ed           PPT 20x3\"    20x3\" 20x3\"  20x3\" 20x3\"    TA Brace  20x3\" w/ pball    20x3\" 20x3\" 20x3\" w/ pball 20x3\" w/ pball   Sciatic N [place]           Bridge  2x10    2x10 2x10 2x10 2x10   Clamshell      20x ea, GTB 20x GTB nv    Supine march w/ TA       10 ea; up/up/down/don 10 ea LE taps   2x10 w/ pball   Supine BKFO w/ TA           Dead bugs LE taps 2x10 w/ pball      NV LE taps   2x10 w/ pball                                    Education            Ther Ex           Active Warm Up  Upright bike 6' lv 4    Upright L4 8' Upright bike L4 6' Upright bike 6' lv 4 Upright bike L4 6'   LTR w/ PBall  20x3\"    20x3\" 20x3\" 20x3\" 20x3\"   Hip Add Iso 20x3\"     20x3\" 20x3\" 20x3\"   Hip Abd Iso  20x3\"     20x3\" 20x3\" 20x3\"              Ther Activity     /      Squat      2x10      Hinge            Lunge            Step Up     8\" 2x10                 Gait Training                                 Modalities                                      "

## 2023-05-31 ENCOUNTER — EVALUATION (OUTPATIENT)
Dept: PHYSICAL THERAPY | Facility: CLINIC | Age: 63
End: 2023-05-31

## 2023-05-31 DIAGNOSIS — M47.896 OTHER SPONDYLOSIS, LUMBAR REGION: Primary | ICD-10-CM

## 2023-05-31 NOTE — LETTER
May 31, 2023    Nagi Huerta   [address]  [address] [place], [place] [id number] [facility] [address] [facility]    Patient: Tea Herr   YOB: 1960   Date of Visit: 2023     Encounter Diagnosis     ICD-10-CM    1  Other spondylosis, lumbar region  M47 896           Dear Dr Ethan Fortune: Thank you for your recent referral of Tea Herr  Please review the attached evaluation summary from Rupesh's recent visit  Please verify that you agree with the plan of care by signing the attached order  If you have any questions or concerns, please do not hesitate to call  I sincerely appreciate the opportunity to share in the care of one of your patients and hope to have another opportunity to work with you in the near future  Sincerely,    Sagrario Branham, PT      Referring Provider:      I certify that I have read the below Plan of Care and certify the need for these services furnished under this plan of treatment while under my care  Nagi Huerta   [address] [place] [id number] [facility] [address] [facility]  Via Fax: [phone]          Progress Note     Today's date: 2023  Patient name: Tea Herr  : 1960  MRN: [id number]  Referring provider: Areli Calderón DO  Dx:   Encounter Diagnosis     ICD-10-CM    1  Other spondylosis, lumbar region  M47 896           Start Time: 1100  Stop Time: 1155  Total time in clinic (min): 55 minutes    Subjective: The patient presents for re-evaluation today  The patient reports improvement in symptoms since beginning skilled physical therapy  The patient reports 0/10 pain at it's worst over the past 24 hours, and reports 100% improvement in overall condition since beginning formal PT care  Pt reports he has no symptoms into the left LE at this time as well as no significant pain in the low back  Reports he has returned to all previous activities without limitation  The patient has no remaining concerns noted related to the low back  Objective:     Postural Observations  Seated posture: fair   Standing posture: fair  Correction of posture: makes symptoms better      Tenderness     Lumbar Spine  No tenderness in the spinous process, left transverse process and right transverse process  Left Hip   No tenderness in the PSIS  Right Hip   No tenderness in the PSIS  Active Range of Motion     Lumbar   Flexion:  WFL  Extension:  Restriction level: minimal   Left lateral flexion: WFL  Right lateral flexion:  WFL  Left rotation:  WFL  Right rotation:  [facility]    Joint Play     WFL throughout the lumbar spine      Strength/Myotome Testing     Lumbar   Left   Normal strength    Right   Normal strength    No reproduction of symptoms with MMT of the LEs          Assessment: Jesus Hopson is a pleasant 70 y o  male who has been receiving PT intervention for low back pain with left LE radiculopathy  The patient has demonstrated decreased pain, increased strength, increased ROM, increased joint mobility, improved body mechanics, improved posture  and increased activity tolerance since beginning treatment  Primary remaining impairments include:    1)  None       Goals  Patient will be independent with home exercise program    Patient will be able to manage symptoms independently  Short Term Goals 2-4 wks   1  Pt will be independent with initial HEP -MET  2  Pt will decrease pain in the left LE to < 2/10 at worst -MET  3  Pt will improve lumbar AROM to WNL and pain free -MET    Long Term Goals 6-8 wks  1  Pt will improve L-spine segmental mobility to WNL and pain free -MET  2  Pt will report standing/sitting tolerance >30 minutes -MET  3  Pt will improve FOTO score to >76 by d/c- Progressing       Plan:   Pt to continue for one additional session to establish HEP and then will be d/c       Precautions: N/A    Access Code: [facility] AT [facility] 5/26 5/31 5/12 5/16 5/19 5/23   L/S JAMAL Galindo Gr1-2  LM ([facility]) KCB    KCB LM ([facility]) Radha Bishop "                         Neuro Re-Ed     5/12      PPT 20x3\" 20x3\"   20x3\" 20x3\"  20x3\" 20x3\"    TA Brace  20x3\" w/ pball 20x3\" w/ pball   20x3\" 20x3\" 20x3\" w/ pball 20x3\" w/ pball   Sciatic N [place]           Bridge  2x10 2x10    2x10 2x10 2x10 2x10   Clamshell      20x ea, GTB 20x GTB nv    Supine march w/ TA       10 ea; up/up/down/don 10 ea LE taps   2x10 w/ pball   Supine BKFO w/ TA           Dead bugs LE taps 2x10 w/ pball LE taps 2x10 w/ pball     NV LE taps   2x10 w/ pball                                    Education   R/A         Ther Ex     5/12      Active Warm Up  Upright bike 6' lv 4 Upright bike 6' lv 4   Upright L4 8' Upright bike L4 6' Upright bike 6' lv 4 Upright bike L4 6'   LTR w/ PBall  20x3\" 20x3\"   20x3\" 20x3\" 20x3\" 20x3\"   Hip Add Iso 20x3\" 20x3\"    20x3\" 20x3\" 20x3\"   Hip Abd Iso  20x3\" 20x3\"    20x3\" 20x3\" 20x3\"              Ther Activity     5/12      Squat      2x10      Hinge            Lunge            Step Up     8\" 2x10                 Gait Training                                 Modalities                                                     "

## 2023-06-02 ENCOUNTER — OFFICE VISIT (OUTPATIENT)
Dept: PHYSICAL THERAPY | Facility: CLINIC | Age: 63
End: 2023-06-02

## 2023-06-02 DIAGNOSIS — M47.896 OTHER SPONDYLOSIS, LUMBAR REGION: Primary | ICD-10-CM

## 2023-06-06 DIAGNOSIS — E78.00 HYPERCHOLESTEREMIA: ICD-10-CM

## 2023-06-06 RX ORDER — SIMVASTATIN 40 MG
TABLET ORAL
Qty: 90 TABLET | Refills: 3 | Status: SHIPPED | OUTPATIENT
Start: 2023-06-06

## 2023-06-22 ENCOUNTER — EVALUATION (OUTPATIENT)
Dept: PHYSICAL THERAPY | Facility: CLINIC | Age: 63
End: 2023-06-22
Payer: MEDICARE

## 2023-06-22 DIAGNOSIS — M17.0 PRIMARY OSTEOARTHRITIS OF BOTH KNEES: Primary | ICD-10-CM

## 2023-06-22 PROCEDURE — 97162 PT EVAL MOD COMPLEX 30 MIN: CPT | Performed by: PHYSICAL THERAPIST

## 2023-06-22 PROCEDURE — 97112 NEUROMUSCULAR REEDUCATION: CPT | Performed by: PHYSICAL THERAPIST

## 2023-06-22 NOTE — PROGRESS NOTES
PT Evaluation     Today's date: 2023  Patient name: Duane Burows  : 1960  MRN: [id number]  Referring provider: Evette Mcelroy MD  Dx:   Encounter Diagnosis     ICD-10-CM    1  Primary osteoarthritis of both knees  M17 0           Start Time: 945  Stop Time: 103  Total time in clinic (min): 45 minutes    Assessment  Assessment details: Duane Burows is a pleasant 70 y o  male who presents with persistent left knee pain  The patient's greatest concerns are the pain he is experiencing, wanting to avoid surgery and fear of not being able to keep active  No further referral appears necessary at this time based upon examination results  Primary movement impairment diagnosis of lack of TKE of the left knee resulting in pathoanatomical symptoms of left knee pain and limiting his ability to exercise or recreation, get out of a chair, sit and walk  Primary Impairments:  1) Decreased AROM of the left knee and LE  2) Decreased dynamic stabilization of the left LE    Etiologic factors include none recalled by the patient  Impairments: abnormal or restricted ROM, activity intolerance, impaired physical strength, lacks appropriate home exercise program and pain with function    Symptom irritability: lowUnderstanding of Dx/Px/POC: good   Prognosis: good  Prognosis details: Positive prognostic indicators include positive attitude toward recovery and good understanding of diagnosis and treatment plan options  Negative prognostic indicators include chronicity of symptoms  Goals  Patient will be independent with home exercise program    Patient will be able to manage symptoms independently  Short Term Goals 2-4 wks   1  Pt will be independent with initial HEP   2  Pt will decrease pain in the left knee to < 2/10 at worst   3  Pt will improve AROM of the left knee to WNL and pain free     Long Term Goals 6-8 wks  1  Pt will improve deficient mm strength in the left LE to 5/5  2   Pt will report no limitations with walking or descending stairs   3  Pt will improve FOTO score to >74 by d/c      Plan  Patient would benefit from: skilled physical therapy  Planned modality interventions: Modalities PRN  Planned therapy interventions: activity modification, manual therapy, neuromuscular re-education, patient education, therapeutic activities, therapeutic exercise, graded activity, home exercise program, behavior modification, self care and balance  Frequency: 2x week  Duration in weeks: 6  Plan of Care expiration date: 8/3/2023  Treatment plan discussed with: patient        Subjective Evaluation    History of Present Illness  Mechanism of injury: Yeimy Quintanilla presents with c/c of left knee pain  Pt reports symptoms present for the last few years, primarily posterior and lateral left knee  No current concerns noted related to the right knee  Pt had cortisone injections in January and March of this year and just received 3rd round of gel injections into B/L knees last week  Primary limitation noted with walking extended distances and descending stairs  No re-occurrence of LBP since stopping PT  Continues to go to the gym 3x/wk  Scheduled to f/u with ortho in mid July  Denies catching, locking, or instability currently  Pain location: lateral and posterior left knee, descriptors: ache, tight  Aggravating factors: walking, descending stairs   Relieving factors: stretching, exercise, rest   24hr pain pattern: 1/10 (current), 0/10 (best), 5/10 (worst)   Imaging: no recent imaging, had MRI about 4 years   Previous treatments: cortisone  Occupation/recreation: retired   Sleeping: no disturbed sleep   Patient concerns: decreasing pain, remaining active, preventing surgery           Objective     Tenderness   Left Knee   Tenderness in the ITB, lateral joint line and medial joint line       Active Range of Motion   Left Knee   Flexion: 105 degrees   Extension: -3 degrees   Extensor lag: 3 degrees     Right Knee Flexion: 120 degrees   Extension: 0 degrees     Strength/Myotome Testing     Left Hip   Planes of Motion   Flexion: 4+  Extension: 4+  Abduction: 4+  Adduction: 5  External rotation: 4+  Internal rotation: 4+    Right Hip   Normal muscle strength    Left Knee   Flexion: 5  Extension: 4+  Quadriceps contraction: good    Right Knee   Normal strength    Left Ankle/Foot   Normal strength    Right Ankle/Foot   Normal strength    Tests     Left Knee   Negative anterior drawer, anterior Lachman, posterior drawer, valgus stress test at 0 degrees, valgus stress test at 30 degrees, varus stress test at 0 degrees and varus stress test at 30 degrees       Ambulation     Observational Gait   Gait: within functional limits              Precautions: N/A    Access Code: [email or web address]   Re-Eval Due: 8/3/23  Auth Expiration: BOMN      Manuals 6/22                                                   Neuro Re-Ed             Quad Set              SLR w/ Quad Set              S/L Hip Abd Iso             Lateral Step Down              Standing TKE                           Education  S/S, POC, HEP             Ther Ex             Active warm up             LAQ             Standing Hip 3-way w/ TB             LE Self Stretch                                       Ther Activity             Squat              Hinge              Lunge              Step Up                           Gait Training                                       Modalities

## 2023-06-22 NOTE — LETTER
[address] [place] [address]    Patient: Karyle Dane   YOB: 1960   Date of Visit: 2023     Encounter Diagnosis     ICD-10-CM    1  Primary osteoarthritis of both knees  M17 0           Dear Dr Bassem Lopes: Thank you for your recent referral of Karyle Dane  Please review the attached evaluation summary from Rupesh's recent visit  Please verify that you agree with the plan of care by signing the attached order  If you have any questions or concerns, please do not hesitate to call  I sincerely appreciate the opportunity to share in the care of one of your patients and hope to have another opportunity to work with you in the near future  Sincerely,    Gabriela Biggs, PT      Referring Provider:      I certify that I have read the below Plan of Care and certify the need for these services furnished under this plan of treatment while under my care  [address] [id number]  Via Fax: [phone]          PT Evaluation     Today's date: 2023  Patient name: Karyle Dane  : 1960  MRN: [id number]  Referring provider: Adonay Tran MD  Dx:   Encounter Diagnosis     ICD-10-CM    1  Primary osteoarthritis of both knees  M17 0           Start Time: 945  Stop Time:   Total time in clinic (min): 45 minutes    Assessment  Assessment details: Karyle Dane is a pleasant 70 y o  male who presents with persistent left knee pain  The patient's greatest concerns are the pain he is experiencing, wanting to avoid surgery and fear of not being able to keep active  No further referral appears necessary at this time based upon examination results      Primary movement impairment diagnosis of lack of TKE of the left knee resulting in pathoanatomical symptoms of left knee pain and limiting his ability to exercise or recreation, get out of a chair, sit and walk  Primary Impairments:  1) Decreased AROM of the left knee and LE  2) Decreased dynamic stabilization of the left LE    Etiologic factors include none recalled by the patient  Impairments: abnormal or restricted ROM, activity intolerance, impaired physical strength, lacks appropriate home exercise program and pain with function    Symptom irritability: lowUnderstanding of Dx/Px/POC: good   Prognosis: good  Prognosis details: Positive prognostic indicators include positive attitude toward recovery and good understanding of diagnosis and treatment plan options  Negative prognostic indicators include chronicity of symptoms  Goals  Patient will be independent with home exercise program    Patient will be able to manage symptoms independently  Short Term Goals 2-4 wks   1  Pt will be independent with initial HEP   2  Pt will decrease pain in the left knee to < 2/10 at worst   3  Pt will improve AROM of the left knee to WNL and pain free     Long Term Goals 6-8 wks  1  Pt will improve deficient mm strength in the left LE to 5/5  2  Pt will report no limitations with walking or descending stairs   3  Pt will improve FOTO score to >74 by d/c      Plan  Patient would benefit from: skilled physical therapy  Planned modality interventions: Modalities PRN  Planned therapy interventions: activity modification, manual therapy, neuromuscular re-education, patient education, therapeutic activities, therapeutic exercise, graded activity, home exercise program, behavior modification, self care and balance  Frequency: 2x week  Duration in weeks: 6  Plan of Care expiration date: 8/3/2023  Treatment plan discussed with: patient        Subjective Evaluation    History of Present Illness  Mechanism of injury: Marguerite Gan presents with c/c of left knee pain  Pt reports symptoms present for the last few years, primarily posterior and lateral left knee  No current concerns noted related to the right knee   Pt had cortisone injections in January and March of this year and just received 3rd round of gel injections into B/L knees last week  Primary limitation noted with walking extended distances and descending stairs  No re-occurrence of LBP since stopping PT  Continues to go to the gym 3x/wk  Scheduled to f/u with ortho in mid July  Denies catching, locking, or instability currently  Pain location: lateral and posterior left knee, descriptors: ache, tight  Aggravating factors: walking, descending stairs   Relieving factors: stretching, exercise, rest   24hr pain pattern: 1/10 (current), 0/10 (best), 5/10 (worst)   Imaging: no recent imaging, had MRI about 4 years   Previous treatments: cortisone  Occupation/recreation: retired   Sleeping: no disturbed sleep   Patient concerns: decreasing pain, remaining active, preventing surgery           Objective     Tenderness   Left Knee   Tenderness in the ITB, lateral joint line and medial joint line  Active Range of Motion   Left Knee   Flexion: 105 degrees   Extension: -3 degrees   Extensor lag: 3 degrees     Right Knee   Flexion: 120 degrees   Extension: 0 degrees     Strength/Myotome Testing     Left Hip   Planes of Motion   Flexion: 4+  Extension: 4+  Abduction: 4+  Adduction: 5  External rotation: 4+  Internal rotation: 4+    Right Hip   Normal muscle strength    Left Knee   Flexion: 5  Extension: 4+  Quadriceps contraction: good    Right Knee   Normal strength    Left Ankle/Foot   Normal strength    Right Ankle/Foot   Normal strength    Tests     Left Knee   Negative anterior drawer, anterior Lachman, posterior drawer, valgus stress test at 0 degrees, valgus stress test at 30 degrees, varus stress test at 0 degrees and varus stress test at 30 degrees       Ambulation     Observational Gait   Gait: within functional limits             Precautions: N/A    Access Code: [email or web address]   Re-Eval Due: 8/3/23  Auth Expiration: Heraclio Anthony      Manuals 6/22 Neuro Re-Ed             Quad Set              SLR w/ Quad Set              S/L Hip Abd Iso             Lateral Step Down              Standing TKE                           Education  S/S, POC, HEP             Ther Ex             Active warm up             LAQ             Standing Hip 3-way w/ TB             LE Self Stretch                                       Ther Activity             Squat              Hinge              Lunge              Step Up                           Gait Training                                       Modalities

## 2023-06-26 ENCOUNTER — OFFICE VISIT (OUTPATIENT)
Dept: PHYSICAL THERAPY | Facility: CLINIC | Age: 63
End: 2023-06-26
Payer: MEDICARE

## 2023-06-26 DIAGNOSIS — M17.0 PRIMARY OSTEOARTHRITIS OF BOTH KNEES: Primary | ICD-10-CM

## 2023-06-26 PROCEDURE — 97112 NEUROMUSCULAR REEDUCATION: CPT

## 2023-06-26 PROCEDURE — 97110 THERAPEUTIC EXERCISES: CPT

## 2023-06-26 NOTE — PROGRESS NOTES
"Daily Note     Today's date: 2023  Patient name: Duane Burows  : 1960  MRN: [id number]  Referring provider: Mehrdad Rodriguez MD  Dx:   Encounter Diagnosis     ICD-10-CM    1  Primary osteoarthritis of both knees  M17 0                      Subjective: Pt states no new complaints  Objective: See treatment diary below      Assessment: Tolerated treatment fair  Patient would benefit from continued PT  Increased L ant knee discomfort noted w/ WB activities; discomfort persists w/ QS, but not as intense  TTP L patellar tendon noted  No significant quad fatigue noted w/ TE performed  Plan: Progress treatment as tolerated         Precautions: N/A    Access Code: [email or web address]   Re-Eval Due: 8/3/23  Auth Expiration: BOMN      Manuals                                                   Neuro Re-Ed             Quad Set   20x3\" ea           SLR w/ Quad Set   2x10 ea           S/L Hip Abd Iso  10x5\" ea           Lateral Step Down   4\" 2x10 ea           Standing TKE   BTB 20x3\"                        Education  S/S, POC, HEP             Ther Ex             Active warm up  Bike 6'           LAQ  5# 2x10 ea           Standing Hip 3-way w/ TB  RTB 10 ea BL           LE Self Stretch  HS stretch at step 20\"x3 ea           SB gastroc stretch  20\"x3                        Ther Activity             Squat              Hinge              Lunge   NV           Step Up   NV                        Gait Training                                       Modalities                                            "

## 2023-06-29 ENCOUNTER — OFFICE VISIT (OUTPATIENT)
Dept: PHYSICAL THERAPY | Facility: CLINIC | Age: 63
End: 2023-06-29
Payer: MEDICARE

## 2023-06-29 DIAGNOSIS — M17.0 PRIMARY OSTEOARTHRITIS OF BOTH KNEES: Primary | ICD-10-CM

## 2023-06-29 PROCEDURE — 97112 NEUROMUSCULAR REEDUCATION: CPT

## 2023-06-29 PROCEDURE — 97110 THERAPEUTIC EXERCISES: CPT

## 2023-06-29 NOTE — PROGRESS NOTES
"Daily Note     Today's date: 2023  Patient name: Marisa Lopze  : 1960  MRN: [id number]  Referring provider: Wayne Madrigal MD  Dx:   Encounter Diagnosis     ICD-10-CM    1  Primary osteoarthritis of both knees  M17 0       2  Other spondylosis, lumbar region  M47 896                      Subjective: Pt reports no increased sx following last visit  Objective: See treatment diary below      Assessment: Tolerated treatment well  Patient exhibited good technique with therapeutic exercises and would benefit from continued PT  Good tolerance to end range knee extension isometric  Anterior/lateral knee discomfort noted w/ addition of lunges; however, exercise is tolerable  Plan: Progress treatment as tolerated         Precautions: N/A    Access Code: [email or web address]   Re-Eval Due: 8/3/23  Auth Expiration: BOMN      Manuals                                                  Neuro Re-Ed             Quad Set   20x3\" ea           SLR w/ Quad Set   2x10 ea 2x10 ea          S/L Hip Abd Iso  10x5\" ea 10x5\" ea          Lateral Step Down   4\" 2x10 ea 4\" 2x10 ea          Standing TKE   BTB 20x3\" BTB 20x3\"          Seated end range knee ext isometric   30\"x3 ea 11#                                    Education  S/S, POC, HEP             Ther Ex             Active warm up  Bike 6' Bike 6'          LAQ  5# 2x10 ea 5# 2x10 ea          Standing Hip 3-way w/ TB  RTB 10 ea BL RTB 15 ea BL          LE Self Stretch  HS stretch at step 20\"x3 ea nv          SB gastroc stretch  20\"x3 20\"x3                       Ther Activity             Squat              Hinge              Lunge   NV 2x10 ea (static)          Step Up   NV                        Gait Training                                       Modalities                                            "

## 2023-07-03 ENCOUNTER — OFFICE VISIT (OUTPATIENT)
Dept: PHYSICAL THERAPY | Facility: CLINIC | Age: 63
End: 2023-07-03
Payer: MEDICARE

## 2023-07-03 DIAGNOSIS — M17.0 PRIMARY OSTEOARTHRITIS OF BOTH KNEES: Primary | ICD-10-CM

## 2023-07-03 PROCEDURE — 97112 NEUROMUSCULAR REEDUCATION: CPT

## 2023-07-03 PROCEDURE — 97110 THERAPEUTIC EXERCISES: CPT

## 2023-07-03 NOTE — PROGRESS NOTES
Daily Note     Today's date: 7/3/2023  Patient name: Russ Beck  : 1960  MRN: [id number]  Referring provider: Yue Melvin MD  Dx:   Encounter Diagnosis     ICD-10-CM    1. Primary osteoarthritis of both knees  M17.0                      Subjective: Pt states no new complaints. Objective: See treatment diary below      Assessment: Tolerated treatment well. Patient exhibited good technique with therapeutic exercises and would benefit from continued PT . Continues to experience anterior knee/patellar tendon discomfort w/ lateral step downs, as well as increased tightness w/ lunges. Good tolerance to progression of 2lbs to SLR and addition of 6" step ups to plyobox. Plan: Progress treatment as tolerated.        Precautions: N/A    Access Code: [email or web address]   Re-Eval Due: 8/3/23  Auth Expiration: BOMN      Manuals 6/22 6/26 6/29 7/3                                                Neuro Re-Ed             Quad Set   20x3" ea           SLR w/ Quad Set   2x10 ea 2x10 ea 2x10 2# ea         S/L Hip Abd Iso  10x5" ea 10x5" ea 10x5" ea         Lateral Step Down   4" 2x10 ea 4" 2x10 ea 4" 2x10ea         Standing TKE   BTB 20x3" BTB 20x3"          Seated end range knee ext isometric   30"x3 ea 11# 11# 30"x3 ea                                   Education  S/S, POC, HEP             Ther Ex             Active warm up  Bike 6' Bike 6' Bike 6'         LAQ  5# 2x10 ea 5# 2x10 ea 5# 2x10 ea         Standing Hip 3-way w/ TB  RTB 10 ea BL RTB 15 ea BL RTB 2x15 ea BL         LE Self Stretch  HS stretch at step 20"x3 ea nv          SB gastroc stretch  20"x3 20"x3 20"x3                      Ther Activity             Squat              Hinge              Lunge   NV 2x10 ea (static) 2x10 ea         Step Up   NV  6" 10 ea plyobox (w/ opp knee lift)                      Gait Training                                       Modalities

## 2023-07-06 ENCOUNTER — OFFICE VISIT (OUTPATIENT)
Dept: PHYSICAL THERAPY | Facility: CLINIC | Age: 63
End: 2023-07-06
Payer: MEDICARE

## 2023-07-06 DIAGNOSIS — M17.0 PRIMARY OSTEOARTHRITIS OF BOTH KNEES: Primary | ICD-10-CM

## 2023-07-06 PROCEDURE — 97112 NEUROMUSCULAR REEDUCATION: CPT | Performed by: PHYSICAL THERAPIST

## 2023-07-06 PROCEDURE — 97110 THERAPEUTIC EXERCISES: CPT | Performed by: PHYSICAL THERAPIST

## 2023-07-06 NOTE — PROGRESS NOTES
Daily Note     Today's date: 2023  Patient name: Jack Yip  : 1960  MRN: [id number]  Referring provider: Benjamin Coy MD  Dx:   Encounter Diagnosis     ICD-10-CM    1. Primary osteoarthritis of both knees  M17.0           Start Time: 1200  Stop Time: 1245  Total time in clinic (min): 45 minutes    Subjective: Pt reports he has noticed some improved mobility in the back of the knee but still has instances when it tightens up. Objective: See treatment diary below      Assessment: Tolerated treatment well. Continued with current POC with good tolerance from the patient. No pain reported with exercises. Occasional cuing required for proper exercise technique. Continue to progress as tolerated. Patient would benefit from continued PT      Plan: Continue per plan of care.       Precautions: N/A    Access Code: [email or web address]   Re-Eval Due: 8/3/23  Auth Expiration: BOMN      Manuals 6/22 6/26 6/29 7/3 7/6                                       Neuro Re-Ed           Quad Set   20x3" ea         SLR w/ Quad Set   2x10 ea 2x10 ea 2x10 2# ea 2x10 2# ea      S/L Hip Abd Iso  10x5" ea 10x5" ea 10x5" ea 10x5" ea       Lateral Step Down   4" 2x10 ea 4" 2x10 ea 4" 2x10ea 2x0 ea 4"       Standing TKE   BTB 20x3" BTB 20x3"        Seated end range knee ext isometric   30"x3 ea 11# 11# 30"x3 ea 3x30" 11#                             Education  S/S, POC, HEP           Ther Ex           Active warm up  Bike 6' Bike 6' Bike 6' Bike L3 6'       LAQ  5# 2x10 ea 5# 2x10 ea 5# 2x10 ea 2x10 ea 5#      Standing Hip 3-way w/ TB  RTB 10 ea BL RTB 15 ea BL RTB 2x15 ea BL 2x15 ea B/L RTB      LE Self Stretch  HS stretch at step 20"x3 ea nv        SB gastroc stretch  20"x3 20"x3 20"x3 3x 20"      Leg Press      2x10 150#                 Ther Activity           Squat            Hinge            Lunge   NV 2x10 ea (static) 2x10 ea 2x10 ea       Step Up   NV  6" 10 ea plyobox (w/ opp knee lift) 2x10 6" plyo box                  Gait Training                                 Modalities

## 2023-07-10 ENCOUNTER — OFFICE VISIT (OUTPATIENT)
Dept: PHYSICAL THERAPY | Facility: CLINIC | Age: 63
End: 2023-07-10
Payer: MEDICARE

## 2023-07-10 DIAGNOSIS — M17.0 PRIMARY OSTEOARTHRITIS OF BOTH KNEES: Primary | ICD-10-CM

## 2023-07-10 PROCEDURE — 97112 NEUROMUSCULAR REEDUCATION: CPT | Performed by: PHYSICAL THERAPIST

## 2023-07-10 PROCEDURE — 97110 THERAPEUTIC EXERCISES: CPT | Performed by: PHYSICAL THERAPIST

## 2023-07-10 NOTE — PROGRESS NOTES
Daily Note     Today's date: 7/10/2023  Patient name: Dustin Overton  : 1960  MRN: [id number]  Referring provider: Claudia Negro MD  Dx:   Encounter Diagnosis     ICD-10-CM    1. Primary osteoarthritis of both knees  M17.0           Start Time: 1200  Stop Time: 1241  Total time in clinic (min): 41 minutes    Subjective: Patient offers no new complaints. Objective: See treatment diary below      Assessment: Patient continues to tolerate treatment well and was able to increase resistance with leg press exercise without onset of adverse effects. Missed exercises were due to PT error. Progress proximal hip strengthening, as able. Plan: Continue per plan of care.       Precautions: N/A    Access Code: [email or web address]   Re-Eval Due: 8/3/23  Auth Expiration: BOMN      Manuals 6/22 6/26 6/29 7/3 7/6 7/10                                      Neuro Re-Ed           Quad Set   20x3" ea         SLR w/ Quad Set   2x10 ea 2x10 ea 2x10 2# ea 2x10 2# ea 2x10 2# ea     S/L Hip Abd Iso  10x5" ea 10x5" ea 10x5" ea 10x5" ea  10x5"     Lateral Step Down   4" 2x10 ea 4" 2x10 ea 4" 2x10ea 2x0 ea 4"  2x0 ea 4"      Standing TKE   BTB 20x3" BTB 20x3"        Seated end range knee ext isometric   30"x3 ea 11# 11# 30"x3 ea 3x30" 11#  3x30" 11#                            Education  S/S, POC, HEP           Ther Ex           Active warm up  Bike 6' Bike 6' Bike 6' Bike L3 6'  L3 6'     LAQ  5# 2x10 ea 5# 2x10 ea 5# 2x10 ea 2x10 ea 5# 2x10 ea 5#+69     Standing Hip 3-way w/ TB  RTB 10 ea BL RTB 15 ea BL RTB 2x15 ea BL 2x15 ea B/L RTB 2x15 ea B/L RTB     LE Self Stretch  HS stretch at step 20"x3 ea nv        SB gastroc stretch  20"x3 20"x3 20"x3 3x 20" 3x 20"     Leg Press      2x10 150# 3x10 150#                Ther Activity           Squat            Hinge            Lunge   NV 2x10 ea (static) 2x10 ea 2x10 ea  nv     Step Up   NV  6" 10 ea plyobox (w/ opp knee lift) 2x10 6" plyo box  nv                Gait Training Modalities

## 2023-07-13 ENCOUNTER — OFFICE VISIT (OUTPATIENT)
Dept: PHYSICAL THERAPY | Facility: CLINIC | Age: 63
End: 2023-07-13
Payer: MEDICARE

## 2023-07-13 DIAGNOSIS — M17.0 PRIMARY OSTEOARTHRITIS OF BOTH KNEES: Primary | ICD-10-CM

## 2023-07-13 PROCEDURE — 97112 NEUROMUSCULAR REEDUCATION: CPT

## 2023-07-13 PROCEDURE — 97110 THERAPEUTIC EXERCISES: CPT

## 2023-07-13 NOTE — PROGRESS NOTES
Daily Note     Today's date: 2023  Patient name: Anibal Ellsworth  : 1960  MRN: [id number]  Referring provider: Yunier Ross MD  Dx:   Encounter Diagnosis     ICD-10-CM    1. Primary osteoarthritis of both knees  M17.0                      Subjective: pt reports knee stiffness R>L. Recent irritation of LBP/sciatic pain. Objective: See treatment diary below      Assessment: Tolerated treatment well. Pt is able to complete session without exacerbation of radiating LE sx. Some compensatory patterns with proximal hip strengthening, more with hip abd. Good tolerance for increased weight on the leg press. Eccentric control with step downs is challenging, UE support required to complete. Patient demonstrated fatigue post session and would benefit from continued PT. Plan: Continue per plan of care. Precautions: N/A    Access Code: [email or web address]   Re-Eval Due: 8/3/23  Auth Expiration: BOMN      Manuals 6/22 6/26 6/29 7/3 7/6 7/10 7/13                                     Neuro Re-Ed           Quad Set   20x3" ea         SLR w/ Quad Set   2x10 ea 2x10 ea 2x10 2# ea 2x10 2# ea 2x10 2# ea 2x10 2# ea    S/L Hip Abd Iso  10x5" ea 10x5" ea 10x5" ea 10x5" ea  10x5" 10x5"    Lateral Step Down   4" 2x10 ea 4" 2x10 ea 4" 2x10ea 2x0 ea 4"  2x0 ea 4"  4" 2x10ea    Standing TKE   BTB 20x3" BTB 20x3"        Seated end range knee ext isometric   30"x3 ea 11# 11# 30"x3 ea 3x30" 11#  3x30" 11#  3x30" 11#                           Education  S/S, POC, HEP           Ther Ex           Active warm up  Bike 6' Bike 6' Bike 6' Bike L3 6'  L3 6' L3 6' upright    LAQ  5# 2x10 ea 5# 2x10 ea 5# 2x10 ea 2x10 ea 5# 2x10 ea 5#+69 2x10 ea 5# 2 sec hold    Standing Hip 3-way w/ TB  RTB 10 ea BL RTB 15 ea BL RTB 2x15 ea BL 2x15 ea B/L RTB 2x15 ea B/L RTB 2x15 ea B/L RTB Green?    LE Self Stretch  HS stretch at step 20"x3 ea nv        SB gastroc stretch  20"x3 20"x3 20"x3 3x 20" 3x 20" 3x 20"    Leg Press      2x10 150# 3x10 150# 3x10 170#               Ther Activity           Squat            Hinge            Lunge   NV 2x10 ea (static) 2x10 ea 2x10 ea  nv 2x10 ea     Step Up   NV  6" 10 ea plyobox (w/ opp knee lift) 2x10 6" plyo box  nv 2x10 6" plyo box                Gait Training                                 Modalities

## 2023-07-17 ENCOUNTER — OFFICE VISIT (OUTPATIENT)
Dept: PHYSICAL THERAPY | Facility: CLINIC | Age: 63
End: 2023-07-17
Payer: MEDICARE

## 2023-07-17 DIAGNOSIS — M17.0 PRIMARY OSTEOARTHRITIS OF BOTH KNEES: Primary | ICD-10-CM

## 2023-07-17 PROCEDURE — 97110 THERAPEUTIC EXERCISES: CPT

## 2023-07-17 PROCEDURE — 97530 THERAPEUTIC ACTIVITIES: CPT

## 2023-07-17 PROCEDURE — 97112 NEUROMUSCULAR REEDUCATION: CPT

## 2023-07-17 NOTE — PROGRESS NOTES
Daily Note     Today's date: 2023  Patient name: Antonia Kathleen  : 1960  MRN: [id number]  Referring provider: Hiwot Rosario MD  Dx:   Encounter Diagnosis     ICD-10-CM    1. Primary osteoarthritis of both knees  M17.0                      Subjective: Pt states his knee "gave out" over the weekend after getting up from a chair at dinner. Objective: See treatment diary below      Assessment: Tolerated treatment well. Patient exhibited good technique with therapeutic exercises and would benefit from continued PT. Progressed to 12" plyobox today w/out significant difficulty. Pt able to tolerate increase resistance w/ leg press w/out increased discomfort. Plan: Progress treatment as tolerated. Pt has MDV w/ orthopedic tomorrow.      Precautions: N/A    Access Code: [email or web address]   Re-Eval Due: 8/3/23  Auth Expiration: BOMN      Manuals 6/22 6/26 6/29 7/3 7/6 7/10 7/13 7/17                                    Neuro Re-Ed           Quad Set   20x3" ea         SLR w/ Quad Set   2x10 ea 2x10 ea 2x10 2# ea 2x10 2# ea 2x10 2# ea 2x10 2# ea 2x10 2# ea   S/L Hip Abd Iso  10x5" ea 10x5" ea 10x5" ea 10x5" ea  10x5" 10x5" 10x5"   Lateral Step Down   4" 2x10 ea 4" 2x10 ea 4" 2x10ea 2x0 ea 4"  2x0 ea 4"  4" 2x10ea 4" 2x10 ea   Standing TKE   BTB 20x3" BTB 20x3"        Seated end range knee ext isometric   30"x3 ea 11# 11# 30"x3 ea 3x30" 11#  3x30" 11#  3x30" 11#  3x30" 11#                         Education  S/S, POC, HEP           Ther Ex           Active warm up  Bike 6' Bike 6' Bike 6' Bike L3 6'  L3 6' L3 6' upright 6' lv 3    LAQ  5# 2x10 ea 5# 2x10 ea 5# 2x10 ea 2x10 ea 5# 2x10 ea 5#+69 2x10 ea 5# 2 sec hold 2x10 ea 5# 2 sec hold   Standing Hip 3-way w/ TB  RTB 10 ea BL RTB 15 ea BL RTB 2x15 ea BL 2x15 ea B/L RTB 2x15 ea B/L RTB 2x15 ea B/L RTB GTB 2x15 ea BL   LE Self Stretch  HS stretch at step 20"x3 ea nv        SB gastroc stretch  20"x3 20"x3 20"x3 3x 20" 3x 20" 3x 20" 20"x3   Leg Press      2x10 150# 3x10 150# 3x10 170# 170/190/210# x10 ea              Ther Activity           Squat            Hinge            Lunge   NV 2x10 ea (static) 2x10 ea 2x10 ea  nv 2x10 ea  2x10 ea   Step Up   NV  6" 10 ea plyobox (w/ opp knee lift) 2x10 6" plyo box  nv 2x10 6" plyo box  2x10 ea 12" plyobox              Gait Training                                 Modalities

## 2023-07-20 ENCOUNTER — OFFICE VISIT (OUTPATIENT)
Dept: PHYSICAL THERAPY | Facility: CLINIC | Age: 63
End: 2023-07-20
Payer: MEDICARE

## 2023-07-20 DIAGNOSIS — M17.0 PRIMARY OSTEOARTHRITIS OF BOTH KNEES: Primary | ICD-10-CM

## 2023-07-20 PROCEDURE — 97110 THERAPEUTIC EXERCISES: CPT | Performed by: PHYSICAL THERAPIST

## 2023-07-20 PROCEDURE — 97112 NEUROMUSCULAR REEDUCATION: CPT | Performed by: PHYSICAL THERAPIST

## 2023-07-20 NOTE — PROGRESS NOTES
Daily Note     Today's date: 2023  Patient name: Kee Mitchell  : 1960  MRN: [id number]  Referring provider: Haroon Mejia MD  Dx:   Encounter Diagnosis     ICD-10-CM    1. Primary osteoarthritis of both knees  M17.0           Start Time: 1200  Stop Time: 1245  Total time in clinic (min): 45 minutes    Subjective: Pt reports his knee feels more stable when he wears the brace and he doesn't have any pain in his knee. No new concerns noted at this time. Objective: See treatment diary below      Assessment: Tolerated treatment well. Continued with current POC with good tolerance from the patient. No pain reported with exercises. Occasional cuing required for proper exercise technique. Pt feels that he has progressed well and can continue managing symptoms independently. Plan: Hold PT and continue with HEP. Pt advised to contact clinic if additional PT services are required.       Precautions: N/A    Access Code: [email or web address]   Re-Eval Due: 8/3/23  Auth Expiration: BOMN      Manuals 7/20   7/3 7/6 7/10 7/13 7/17                                    Neuro Re-Ed           Quad Set            SLR w/ Quad Set  2x10 2# ea   2x10 2# ea 2x10 2# ea 2x10 2# ea 2x10 2# ea 2x10 2# ea   S/L Hip Abd Iso 10x5"   10x5" ea 10x5" ea  10x5" 10x5" 10x5"   Lateral Step Down  20x 4"   4" 2x10ea 2x0 ea 4"  2x0 ea 4"  4" 2x10ea 4" 2x10 ea   Standing TKE            Seated end range knee ext isometric 3x30" 11#   11# 30"x3 ea 3x30" 11#  3x30" 11#  3x30" 11#  3x30" 11#                         Education            Ther Ex           Active warm up L3 6' upright   Bike 6' Bike L3 6'  L3 6' L3 6' upright 6' lv 3    LAQ 2x10 ea 5# 2 sec hold   5# 2x10 ea 2x10 ea 5# 2x10 ea 5#+69 2x10 ea 5# 2 sec hold 2x10 ea 5# 2 sec hold   Standing Hip 3-way w/ TB GTB 2x15 ea BL   RTB 2x15 ea BL 2x15 ea B/L RTB 2x15 ea B/L RTB 2x15 ea B/L RTB GTB 2x15 ea BL   LE Self Stretch IT band 5x10" supine & stand          SB gastroc stretch 3x 20" 20"x3 3x 20" 3x 20" 3x 20" 20"x3   Leg Press  170# 10x  190# 2x10    2x10 150# 3x10 150# 3x10 170# 170/190/210# x10 ea              Ther Activity           Squat            Hinge            Lunge     2x10 ea 2x10 ea  nv 2x10 ea  2x10 ea   Step Up  2x10 ea 12" plyobox   6" 10 ea plyobox (w/ opp knee lift) 2x10 6" plyo box  nv 2x10 6" plyo box  2x10 ea 12" plyobox              Gait Training                                 Modalities

## 2023-08-14 NOTE — PROGRESS NOTES
Encounter Date: 2023       History     Chief Complaint   Patient presents with    Shoulder Pain     X 1 month     URI     Cough, sore throat started Saturday      30-year-old female presents to the emergency department to be evaluated for runny nose, sore throat and cough that began 2 days ago.  Her son has similar symptoms.    The history is provided by the patient.   URI  The primary symptoms include sore throat and cough. Primary symptoms do not include fever, fatigue, headaches, ear pain, swollen glands, wheezing, abdominal pain, nausea, vomiting, myalgias, arthralgias or rash.   Symptoms associated with the illness include congestion and rhinorrhea.     Review of patient's allergies indicates:  No Known Allergies  Past Medical History:   Diagnosis Date    History of gonorrhea     Hypertension     Migraine without aura, intractable, without status migrainosus     Noncompliance with medication regimen     Syncope      Past Surgical History:   Procedure Laterality Date    Abdominal Wall Mass Surgery  2019     SECTION  08/10/2008     SECTION  01/15/2013    DILATION AND CURETTAGE OF UTERUS  2012     Family History   Problem Relation Age of Onset    Hypertension Mother     Hypertension Father     No Known Problems Brother     No Known Problems Sister     No Known Problems Sister     No Known Problems Sister      Social History     Tobacco Use    Smoking status: Every Day     Current packs/day: 1.00     Average packs/day: 1 pack/day for 17.6 years (17.6 ttl pk-yrs)     Types: Cigarettes     Start date:     Smokeless tobacco: Never   Substance Use Topics    Alcohol use: Never    Drug use: Never     Review of Systems   Constitutional:  Negative for fatigue and fever.   HENT:  Positive for congestion, rhinorrhea and sore throat. Negative for ear pain.    Respiratory:  Positive for cough. Negative for wheezing.    Gastrointestinal:  Negative for abdominal pain, nausea and vomiting.  Progress Note     Today's date: 2023  Patient name: Scott Arora  : 1960  MRN: [id number]  Referring provider: Brandi Davalos DO  Dx:   Encounter Diagnosis     ICD-10-CM    1  Other spondylosis, lumbar region  M47 896           Start Time: 1100  Stop Time: 1155  Total time in clinic (min): 55 minutes    Subjective: The patient presents for re-evaluation today  The patient reports improvement in symptoms since beginning skilled physical therapy  The patient reports 0/10 pain at it's worst over the past 24 hours, and reports 100% improvement in overall condition since beginning formal PT care  Pt reports he has no symptoms into the left LE at this time as well as no significant pain in the low back  Reports he has returned to all previous activities without limitation  The patient has no remaining concerns noted related to the low back  Objective:     Postural Observations  Seated posture: fair   Standing posture: fair  Correction of posture: makes symptoms better      Tenderness     Lumbar Spine  No tenderness in the spinous process, left transverse process and right transverse process  Left Hip   No tenderness in the PSIS  Right Hip   No tenderness in the PSIS  Active Range of Motion     Lumbar   Flexion:  WFL  Extension:  Restriction level: minimal   Left lateral flexion: WFL  Right lateral flexion:  WFL  Left rotation:  WFL  Right rotation:  [facility]    Joint Play     WFL throughout the lumbar spine      Strength/Myotome Testing     Lumbar   Left   Normal strength    Right   Normal strength    No reproduction of symptoms with MMT of the LEs          Assessment: Scott Arora is a pleasant 70 y o  male who has been receiving PT intervention for low back pain with left LE radiculopathy   The patient has demonstrated decreased pain, increased strength, increased ROM, increased joint mobility, improved body mechanics, improved posture  and increased activity tolerance since   Musculoskeletal:  Negative for arthralgias and myalgias.   Skin:  Negative for rash.   Neurological:  Negative for headaches.   All other systems reviewed and are negative.      Physical Exam     Initial Vitals [23 0945]   BP Pulse Resp Temp SpO2   126/83 81 16 98.9 °F (37.2 °C) 100 %      MAP       --         Physical Exam    Vitals reviewed.  Constitutional: She appears well-developed and well-nourished.   HENT:   Mouth/Throat: Oropharynx is clear and moist.   Neck: Neck supple.   Cardiovascular:  Normal rate and regular rhythm.           Pulmonary/Chest: Breath sounds normal.   Abdominal: Abdomen is soft. Bowel sounds are normal. She exhibits no distension and no mass. There is no abdominal tenderness. There is no rebound and no guarding.   Musculoskeletal:         General: Normal range of motion.      Cervical back: Neck supple.     Neurological: She is alert and oriented to person, place, and time. She has normal strength. GCS score is 15. GCS eye subscore is 4. GCS verbal subscore is 5. GCS motor subscore is 6.   Skin: Skin is warm and dry. Capillary refill takes less than 2 seconds.   Psychiatric: She has a normal mood and affect.         Medical Screening Exam   See Full Note    ED Course   Procedures  Labs Reviewed   SARS-COV2 (COVID) W/ FLU ANTIGEN          Imaging Results    None          Medications - No data to display  Medical Decision Makin-year-old female presents to the emergency department to be evaluated for runny nose, sore throat and cough that began 2 days ago.  Her son has similar symptoms.  Patient will check her MyChart for flu and COVID results  Diagnosis: Viral UR                         Clinical Impression:   Final diagnoses:  [J06.9] Viral URI with cough (Primary)        ED Disposition Condition    Discharge Stable          ED Prescriptions    None       Follow-up Information    None          Jodee Baca FNP  23 1030     "beginning treatment  Primary remaining impairments include:    1)  None       Goals  Patient will be independent with home exercise program    Patient will be able to manage symptoms independently  Short Term Goals 2-4 wks   1  Pt will be independent with initial HEP -MET  2  Pt will decrease pain in the left LE to < 2/10 at worst -MET  3  Pt will improve lumbar AROM to WNL and pain free -MET    Long Term Goals 6-8 wks  1  Pt will improve L-spine segmental mobility to WNL and pain free -MET  2  Pt will report standing/sitting tolerance >30 minutes -MET  3  Pt will improve FOTO score to >76 by d/c- Progressing       Plan:   Pt to continue for one additional session to establish HEP and then will be d/c        Precautions: N/A    Access Code: [facility] AT [facility] 5/26 5/31 5/12 5/16 5/19 5/23   L/S PA Mobs Gr1-2  LM ([facility]) KCB   LH KCB LM ([facility]) KCB                                    Neuro Re-Ed     5/12      PPT 20x3\" 20x3\"   20x3\" 20x3\"  20x3\" 20x3\"    TA Brace  20x3\" w/ pball 20x3\" w/ pball   20x3\" 20x3\" 20x3\" w/ pball 20x3\" w/ pball   Sciatic N [place]           Bridge  2x10 2x10    2x10 2x10 2x10 2x10   Clamshell      20x ea, GTB 20x GTB nv    Supine march w/ TA       10 ea; up/up/down/don 10 ea LE taps   2x10 w/ pball   Supine BKFO w/ TA           Dead bugs LE taps 2x10 w/ pball LE taps 2x10 w/ pball     NV LE taps   2x10 w/ pball                                    Education   R/A         Ther Ex     5/12      Active Warm Up  Upright bike 6' lv 4 Upright bike 6' lv 4   Upright L4 8' Upright bike L4 6' Upright bike 6' lv 4 Upright bike L4 6'   LTR w/ PBall  20x3\" 20x3\"   20x3\" 20x3\" 20x3\" 20x3\"   Hip Add Iso 20x3\" 20x3\"    20x3\" 20x3\" 20x3\"   Hip Abd Iso  20x3\" 20x3\"    20x3\" 20x3\" 20x3\"              Ther Activity     5/12      Squat      2x10      Hinge            Lunge            Step Up     8\" 2x10                 Gait Training                                 Modalities                                    "

## 2023-09-19 ENCOUNTER — TELEPHONE (OUTPATIENT)
Age: 63
End: 2023-09-19

## 2023-11-03 ENCOUNTER — OFFICE VISIT (OUTPATIENT)
Dept: FAMILY MEDICINE CLINIC | Facility: CLINIC | Age: 63
End: 2023-11-03
Payer: MEDICARE

## 2023-11-03 VITALS
SYSTOLIC BLOOD PRESSURE: 120 MMHG | RESPIRATION RATE: 18 BRPM | HEART RATE: 87 BPM | OXYGEN SATURATION: 100 % | HEIGHT: 74 IN | BODY MASS INDEX: 27.59 KG/M2 | DIASTOLIC BLOOD PRESSURE: 72 MMHG | TEMPERATURE: 97.9 F | WEIGHT: 215 LBS

## 2023-11-03 DIAGNOSIS — I10 ESSENTIAL (PRIMARY) HYPERTENSION: Primary | ICD-10-CM

## 2023-11-03 DIAGNOSIS — Z00.00 WELCOME TO MEDICARE PREVENTIVE VISIT: ICD-10-CM

## 2023-11-03 DIAGNOSIS — I25.10 CAD S/P PERCUTANEOUS CORONARY ANGIOPLASTY: ICD-10-CM

## 2023-11-03 DIAGNOSIS — E79.0 HYPERURICEMIA: ICD-10-CM

## 2023-11-03 DIAGNOSIS — R68.82 LOW LIBIDO: ICD-10-CM

## 2023-11-03 DIAGNOSIS — R73.01 IMPAIRED FASTING GLUCOSE: ICD-10-CM

## 2023-11-03 DIAGNOSIS — Z12.5 SCREENING FOR PROSTATE CANCER: ICD-10-CM

## 2023-11-03 DIAGNOSIS — M17.0 PRIMARY OSTEOARTHRITIS OF BOTH KNEES: ICD-10-CM

## 2023-11-03 DIAGNOSIS — I83.91 ASYMPTOMATIC VARICOSE VEINS OF RIGHT LOWER EXTREMITY: ICD-10-CM

## 2023-11-03 DIAGNOSIS — E78.2 MIXED HYPERLIPIDEMIA: ICD-10-CM

## 2023-11-03 DIAGNOSIS — Z98.61 CAD S/P PERCUTANEOUS CORONARY ANGIOPLASTY: ICD-10-CM

## 2023-11-03 PROCEDURE — 99214 OFFICE O/P EST MOD 30 MIN: CPT | Performed by: FAMILY MEDICINE

## 2023-11-03 PROCEDURE — G0402 INITIAL PREVENTIVE EXAM: HCPCS | Performed by: FAMILY MEDICINE

## 2023-11-03 RX ORDER — NITROGLYCERIN 0.4 MG/1
0.4 TABLET SUBLINGUAL
Qty: 25 TABLET | Refills: 1 | Status: SHIPPED | OUTPATIENT
Start: 2023-11-03

## 2023-11-03 RX ORDER — ALLOPURINOL 300 MG/1
300 TABLET ORAL DAILY
Qty: 90 TABLET | Refills: 3 | Status: SHIPPED | OUTPATIENT
Start: 2023-11-03

## 2023-11-03 RX ORDER — LISINOPRIL 2.5 MG/1
2.5 TABLET ORAL DAILY
Qty: 90 TABLET | Refills: 3 | Status: SHIPPED | OUTPATIENT
Start: 2023-11-03

## 2023-11-03 RX ORDER — SIMVASTATIN 40 MG
40 TABLET ORAL DAILY
Qty: 90 TABLET | Refills: 3 | Status: SHIPPED | OUTPATIENT
Start: 2023-11-03

## 2023-11-03 NOTE — PROGRESS NOTES
Assessment and Plan:     Problem List Items Addressed This Visit          Endocrine    Impaired fasting glucose    Relevant Orders    Hemoglobin A1C       Cardiovascular and Mediastinum    CAD S/P percutaneous coronary angioplasty    Relevant Medications    nitroglycerin (NITROSTAT) 0.4 mg SL tablet    Essential (primary) hypertension - Primary    Relevant Medications    lisinopril (ZESTRIL) 2.5 mg tablet    Other Relevant Orders    Comprehensive metabolic panel    CBC and differential       Musculoskeletal and Integument    Primary osteoarthritis of both knees    Relevant Orders    Ambulatory Referral to Orthopedic Surgery       Other    Mixed hyperlipidemia    Relevant Medications    simvastatin (ZOCOR) 40 mg tablet    Other Relevant Orders    Lipid panel     Other Visit Diagnoses       Hyperuricemia        Relevant Medications    allopurinol (ZYLOPRIM) 300 mg tablet    Low libido        Relevant Orders    Testosterone    Asymptomatic varicose veins of right lower extremity        Welcome to Medicare preventive visit        Screening for prostate cancer        Relevant Orders    PSA, Total Screen          Continue with current medications I change his Lisinopril to 2.5 mg daily. Monitor BP at home. He can use a full 20 mg as needed Cialis. Follow-up with Cardiology. Orthopedic referral for second opinion. Patient offered flu shot/Prevnar 20 he declined. Office visit 1 year. BMI Counseling: Body mass index is 27.98 kg/m². The BMI is above normal. Nutrition recommendations include decreasing portion sizes, consuming healthier snacks, moderation in carbohydrate intake, reducing intake of saturated and trans fat and reducing intake of cholesterol. Exercise recommendations include exercising 3-5 times per week. No pharmacotherapy was ordered. Rationale for BMI follow-up plan is due to patient being overweight or obese.      Depression Screening and Follow-up Plan: Patient was screened for depression during today's encounter. They screened negative with a PHQ-2 score of 2. Preventive health issues were discussed with patient, and age appropriate screening tests were ordered as noted in patient's After Visit Summary. Personalized health advice and appropriate referrals for health education or preventive services given if needed, as noted in patient's After Visit Summary. History of Present Illness:     Patient presents for a Medicare Wellness Visit    Follow up OV for chronic medical problems/Welcome to Medicare Hypertension on Lisinopril 5 mg daily he has had a # of low BP readings and started taking BP medication every other day. He lost 47 lbs with diet since last year. Last labs 11/2022 creatinine 0.87. GFR 91. Electrolytes normal. Hgb 14.6  Hyperlipidemia and CAD  lipid profile 11/2022 Cholesterol 155. Triglycerides 218 increased from 161. HDL 37. LDL 74. LFTs normal. Impaired fasting glucose 11/2021 FBS 99 decreased from 101. A1c 5.4          Recent Results (from the past 65510 hour(s))   Hemoglobin A1C    Collection Time: 11/15/22 11:27 AM   Result Value Ref Range    Hemoglobin A1C 5.4 Normal 3.8-5.6%; PreDiabetic 5.7-6.4%;  Diabetic >=6.5%; Glycemic control for adults with diabetes <7.0% %     mg/dl   Comprehensive metabolic panel    Collection Time: 11/15/22 11:27 AM   Result Value Ref Range    Sodium 140 135 - 147 mmol/L    Potassium 4.5 3.5 - 5.3 mmol/L    Chloride 108 96 - 108 mmol/L    CO2 26 21 - 32 mmol/L    ANION GAP 6 4 - 13 mmol/L    BUN 17 5 - 25 mg/dL    Creatinine 0.87 0.60 - 1.30 mg/dL    Glucose, Fasting 99 65 - 99 mg/dL    Calcium 9.1 8.3 - 10.1 mg/dL    AST 23 5 - 45 U/L    ALT 44 12 - 78 U/L    Alkaline Phosphatase 71 46 - 116 U/L    Total Protein 7.7 6.4 - 8.4 g/dL    Albumin 3.9 3.5 - 5.0 g/dL    Total Bilirubin 0.64 0.20 - 1.00 mg/dL    eGFR 91 ml/min/1.73sq m   CBC and differential    Collection Time: 11/15/22 11:27 AM   Result Value Ref Range    WBC 5.50 4.31 - 10.16 Thousand/uL    RBC 4.74 3.88 - 5.62 Million/uL    Hemoglobin 14.6 12.0 - 17.0 g/dL    Hematocrit 43.2 36.5 - 49.3 %    MCV 91 82 - 98 fL    MCH 30.8 26.8 - 34.3 pg    MCHC 33.8 31.4 - 37.4 g/dL    RDW 13.0 11.6 - 15.1 %    MPV 11.2 8.9 - 12.7 fL    Platelets 314 782 - 254 Thousands/uL    nRBC 0 /100 WBCs    Neutrophils Relative 57 43 - 75 %    Immat GRANS % 0 0 - 2 %    Lymphocytes Relative 31 14 - 44 %    Monocytes Relative 9 4 - 12 %    Eosinophils Relative 2 0 - 6 %    Basophils Relative 1 0 - 1 %    Neutrophils Absolute 3.16 1.85 - 7.62 Thousands/µL    Immature Grans Absolute 0.02 0.00 - 0.20 Thousand/uL    Lymphocytes Absolute 1.70 0.60 - 4.47 Thousands/µL    Monocytes Absolute 0.48 0.17 - 1.22 Thousand/µL    Eosinophils Absolute 0.09 0.00 - 0.61 Thousand/µL    Basophils Absolute 0.05 0.00 - 0.10 Thousands/µL   Lipid panel    Collection Time: 11/15/22 11:27 AM   Result Value Ref Range    Cholesterol 155 See Comment mg/dL    Triglycerides 218 (H) See Comment mg/dL    HDL, Direct 37 (L) >=40 mg/dL    LDL Calculated 74 0 - 100 mg/dL    Non-HDL-Chol (CHOL-HDL) 118 mg/dl   Uric acid    Collection Time: 11/15/22 11:27 AM   Result Value Ref Range    Uric Acid 5.0 3.5 - 8.5 mg/dL   PSA, Total Screen    Collection Time: 11/15/22 11:27 AM   Result Value Ref Range    PSA 0.7 0.0 - 4.0 ng/mL           Patient Care Team:  Mushtaq Mccoy MD as PCP - Darwin Trinidad MD as Endoscopist     Review of Systems:     Review of Systems   Constitutional:  Positive for unexpected weight change (see HPI). Negative for appetite change, chills and fever. HENT:  Negative for congestion, ear pain, rhinorrhea, sore throat and trouble swallowing. Eyes:  Negative for visual disturbance. Respiratory:  Negative for cough, shortness of breath and wheezing. Prior COV 19 infection. Cardiovascular:  Positive for leg swelling (varicose veins RLE. mild chronic swelling RLE  prior vein surgery).  Negative for chest pain and palpitations. See HPI. status post stent 11/2008.  10/2020 stress echocardiogram normal no evidence of ischemia. Ejection fraction 60-65%. Mild mitral regurgitation. Trace aortic insufficiency. Gastrointestinal:  Negative for abdominal pain, blood in stool, constipation, diarrhea, nausea and vomiting. 01/2019 colonoscopy   Endocrine: Negative for polydipsia and polyuria. Genitourinary:  Negative for difficulty urinating.        + ED  Cialis 20 mg 1/2  PRN ineffective. Decreased libido     Lab Results       Component                Value               Date                       PSA                      0.7                 11/15/2022                 PSA                      0.5                 11/09/2021                          Musculoskeletal:  Positive for arthralgias and back pain. Negative for myalgias. OA knees followed by Ortho. He has had steroid injections, visco supplementation and PT. Intermittent pain L lateral thigh receiving PT L IT band problem. Hyperuricemia on Allopurinol 300 mg daily      Lab Results       Component                Value               Date                       URICACID                 5.0                 11/15/2022               Skin:  Negative for rash. Allergic/Immunologic: Negative for environmental allergies. Neurological:  Negative for dizziness and headaches. Hematological:  Negative for adenopathy. Does not bruise/bleed easily. Psychiatric/Behavioral:  Negative for dysphoric mood and sleep disturbance. The patient is not nervous/anxious.          Problem List:     Patient Active Problem List   Diagnosis    CAD S/P percutaneous coronary angioplasty    Essential (primary) hypertension    Mixed hyperlipidemia    Vitamin D deficiency    Male erectile disorder    Impaired fasting glucose    Primary osteoarthritis of both knees      Past Medical and Surgical History:     Past Medical History:   Diagnosis Date    CAD (coronary artery disease) s/p stents    Chronic idiopathic gout 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 tophus 3/18/2015    Hyperlipidemia     Hypertension      Past Surgical History:   Procedure Laterality Date    CORONARY ANGIOPLASTY WITH STENT PLACEMENT      HEMORROIDECTOMY      HERNIA REPAIR      MD COLONOSCOPY FLX DX W/COLLJ SPEC WHEN PFRMD N/A 2019    Procedure: COLONOSCOPY;  Surgeon: Bruno Thompson MD;  Location: [facility]; Service: Colorectal      Family History:     Family History   Problem Relation Age of Onset    Coronary artery disease Mother       Social History:     Social History     Socioeconomic History    Marital status: /Civil Union     Spouse name: None    Number of children: None    Years of education: None    Highest education level: None   Occupational History    None   Tobacco Use    Smoking status: Former     Packs/day: 3.00     Years: 10.00     Total pack years: 30.00     Types: Cigarettes     Quit date: 1989     Years since quittin.8    Smokeless tobacco: Never   Vaping Use    Vaping Use: Never used   Substance and Sexual Activity    Alcohol use: Yes     Comment: 1 per week    Drug use: No    Sexual activity: Yes     Partners: Female     Birth control/protection: None   Other Topics Concern    None   Social History Narrative    None     Social Determinants of Health     Financial Resource Strain: Unknown (10/27/2023)    Overall Financial Resource Strain (CARDIA)     Difficulty of Paying Living Expenses: Patient refused   Food Insecurity: Not on file   Transportation Needs: No Transportation Needs (10/27/2023)    PRAPARE - Transportation     Lack of Transportation (Medical): No     Lack of Transportation (Non-Medical):  No   Physical Activity: Not on file   Stress: Not on file   Social Connections: Not on file   Intimate Partner Violence: Not on file   Housing Stability: Not on file      Medications and Allergies:     Current Outpatient Medications   Medication Sig Dispense Refill allopurinol (ZYLOPRIM) 300 mg tablet Take 1 tablet (300 mg total) by mouth daily 90 tablet 3    aspirin 81 MG tablet Take 81 mg by mouth daily      lisinopril (ZESTRIL) 2.5 mg tablet Take 1 tablet (2.5 mg total) by mouth daily 90 tablet 3    Multiple Vitamins-Minerals (MENS 50+ MULTI VITAMIN/MIN PO) Take 1 tablet by mouth every morning      nitroglycerin (NITROSTAT) 0.4 mg SL tablet Place 1 tablet (0.4 mg total) under the tongue every 5 (five) minutes as needed for chest pain 25 tablet 1    simvastatin (ZOCOR) 40 mg tablet Take 1 tablet (40 mg total) by mouth daily 90 tablet 3    tadalafil (CIALIS) 20 MG tablet Take 1 tablet (20 mg total) by mouth daily as needed for erectile dysfunction 10 tablet 3     No current facility-administered medications for this visit. No Known Allergies   Immunizations:     Immunization History   Administered Date(s) Administered    COVID-19 PFIZER VACCINE 0.3 ML IM 05/27/2021, 06/22/2021    COVID-19 Pfizer vac (Piter-sucrose, gray cap) 12 yr+ IM 01/27/2022    Tdap 02/01/2017      Health Maintenance:         Topic Date Due    Colorectal Cancer Screening  01/11/2024    HIV Screening  Completed    Hepatitis C Screening  Completed         Topic Date Due    COVID-19 Vaccine (4 - Pfizer series) 03/24/2022    Pneumococcal Vaccine: 65+ Years (1 - PCV) Never done    Influenza Vaccine (1) Never done      Medicare Screening Tests and Risk Assessments:     Luis Brandon is here for his Welcome to Medicare visit. Health Risk Assessment:   Patient rates overall health as fair. Patient feels that their physical health rating is slightly better. Patient is satisfied with their life. Eyesight was rated as slightly worse. Hearing was rated as same. Patient feels that their emotional and mental health rating is same. Patients states they are sometimes angry. Patient states they are sometimes unusually tired/fatigued. Pain experienced in the last 7 days has been some. Patient's pain rating has been 7/10. Patient states that he has experienced no weight loss or gain in last 6 months. Lost 40lbs changed diet and going to the gym. Depression Screening:   PHQ-2 Score: 2      Fall Risk Screening: In the past year, patient has experienced: no history of falling in past year      Home Safety:  Patient does not have trouble with stairs inside or outside of their home. Patient has working smoke alarms and has working carbon monoxide detector. Home safety hazards include: none. Nutrition:   Current diet is Low Carb, No Added Salt and Limited junk food. Medications:   Patient is currently taking over-the-counter supplements. OTC medications include: see medication list. Patient is able to manage medications. Activities of Daily Living (ADLs)/Instrumental Activities of Daily Living (IADLs):   Walk and transfer into and out of bed and chair?: Yes  Dress and groom yourself?: Yes    Bathe or shower yourself?: Yes    Feed yourself?  Yes  Do your laundry/housekeeping?: Yes  Manage your money, pay your bills and track your expenses?: Yes  Make your own meals?: Yes    Do your own shopping?: Yes    Durable Medical Equipment Suppliers  None    Previous Hospitalizations:   Any hospitalizations or ED visits within the last 12 months?: No      Advance Care Planning:   Living will: Yes    Durable POA for healthcare: No    Advanced directive: Yes      Cognitive Screening:   Provider or family/friend/caregiver concerned regarding cognition?: No    PREVENTIVE SCREENINGS      Cardiovascular Screening:    General: History Lipid Disorder    Due for: Lipid Panel      Diabetes Screening:     General: Screening Current    Due for: Blood Glucose      Colorectal Cancer Screening:     General: Screening Current      Prostate Cancer Screening:    General: Screening Current    Due for: PSA      Osteoporosis Screening:    General: Screening Not Indicated      Abdominal Aortic Aneurysm (AAA) Screening:    Risk factors include: age between 70-77 yo and tobacco use      Due for: Screening AAA Ultrasound      Lung Cancer Screening:     General: Screening Not Indicated      Hepatitis C Screening:    General: Screening Current    Screening, Brief Intervention, and Referral to Treatment (SBIRT)    Screening  Typical number of drinks in a day: 0  Typical number of drinks in a week: 1  Interpretation: Low risk drinking behavior. AUDIT-C Screenin) How often did you have a drink containing alcohol in the past year? 2 to 4 times a month  2) How many drinks did you have on a typical day when you were drinking in the past year? 1 to 2  3) How often did you have 6 or more drinks on one occasion in the past year? less than monthly    AUDIT-C Score: 3  Interpretation: Score 0-3 (male): Negative screen for alcohol misuse    Single Item Drug Screening:  How often have you used an illegal drug (including marijuana) or a prescription medication for non-medical reasons in the past year? never    Single Item Drug Screen Score: 0  Interpretation: Negative screen for possible drug use disorder    Brief Intervention  Alcohol & drug use screenings were reviewed. No concerns regarding substance use disorder identified. Other Counseling Topics:   Regular weightbearing exercise and calcium and vitamin D intake.      Hearing Screening    500Hz 1000Hz 2000Hz 4000Hz   Right ear Pass Pass failed Pass   Left ear Pass Pass failed Pass     Vision Screening    Right eye Left eye Both eyes   Without correction 20/25 20/25 20/20   With correction            Physical Exam:     /72 (BP Location: Left arm, Patient Position: Sitting, Cuff Size: Large)   Pulse 87   Temp 97.9 °F (36.6 °C)   Resp 18   Ht 6' 1.5" (1.867 m)   Wt 97.5 kg (215 lb)   SpO2 100%   BMI 27.98 kg/m²      BP Readings from Last 3 Encounters:   23 120/72   22 132/72   21 118/82      Wt Readings from Last 3 Encounters:   23 97.5 kg (215 lb)   22 119 kg (262 lb)   21 108 kg (237 lb)          Physical Exam  Constitutional:       General: He is not in acute distress. HENT:      Right Ear: Tympanic membrane and ear canal normal.      Left Ear: Tympanic membrane and ear canal normal.      Mouth/Throat:      Mouth: No oral lesions. Pharynx: Oropharynx is clear. Eyes:      General: No scleral icterus. Extraocular Movements: Extraocular movements intact. Conjunctiva/sclera: Conjunctivae normal.      Pupils: Pupils are equal, round, and reactive to light. Neck:      Vascular: No carotid bruit. Cardiovascular:      Rate and Rhythm: Normal rate and regular rhythm. Heart sounds: No murmur heard. No gallop. Comments: R calf larger than L calf. No right calf tenderness or Homans' sign. Multiple venous varicosities right lower leg. Mild stasis pigmentation changes right lower leg  Pulmonary:      Effort: Pulmonary effort is normal.      Breath sounds: Normal breath sounds. No wheezing or rales. Abdominal:      General: Bowel sounds are normal. There is no distension. Palpations: Abdomen is soft. There is no hepatomegaly, splenomegaly or mass. Tenderness: There is no abdominal tenderness. There is no guarding or rebound. Musculoskeletal:      Right lower leg: No edema. Left lower leg: No edema. Lymphadenopathy:      Cervical: No cervical adenopathy. Skin:     Findings: No rash. Neurological:      General: No focal deficit present. Mental Status: He is alert and oriented to person, place, and time.    Psychiatric:         Mood and Affect: Mood normal.          Edu Jamison MD

## 2023-11-03 NOTE — PATIENT INSTRUCTIONS
Medicare Preventive Visit Patient Instructions  Thank you for completing your Welcome to Medicare Visit or Medicare Annual Wellness Visit today. Your next wellness visit will be due in one year (11/3/2024). The screening/preventive services that you may require over the next 5-10 years are detailed below. Some tests may not apply to you based off risk factors and/or age. Screening tests ordered at today's visit but not completed yet may show as past due. Also, please note that scanned in results may not display below. Preventive Screenings:  Service Recommendations Previous Testing/Comments   Colorectal Cancer Screening  Colonoscopy    Fecal Occult Blood Test (FOBT)/Fecal Immunochemical Test (FIT)  Fecal DNA/Cologuard Test  Flexible Sigmoidoscopy Age: 43-73 years old   Colonoscopy: every 10 years (May be performed more frequently if at higher risk)  OR  FOBT/FIT: every 1 year  OR  Cologuard: every 3 years  OR  Sigmoidoscopy: every 5 years  Screening may be recommended earlier than age 39 if at higher risk for colorectal cancer. Also, an individualized decision between you and your healthcare provider will decide whether screening between the ages of 77-80 would be appropriate.  Colonoscopy: 01/11/2019  FOBT/FIT: Not on file  Cologuard: Not on file  Sigmoidoscopy: Not on file    Screening Current  Screening Current     Prostate Cancer Screening Individualized decision between patient and health care provider in men between ages of 53-66   Medicare will cover every 12 months beginning on the day after your 50th birthday PSA: 0.7 ng/mL     Screening Current  Screening Current     Hepatitis C Screening Once for adults born between 1945 and 1965  More frequently in patients at high risk for Hepatitis C Hep C Antibody: 02/01/2020    Screening Current  Screening Current   Diabetes Screening 1-2 times per year if you're at risk for diabetes or have pre-diabetes Fasting glucose: 99 mg/dL (11/15/2022)  A1C: 5.4 % (11/15/2022)  Screening Current  Screening Current   Cholesterol Screening Once every 5 years if you don't have a lipid disorder. May order more often based on risk factors. Lipid panel: 11/15/2022  Screening Not Indicated  History Lipid Disorder  Screening Not Indicated  History Lipid Disorder      Other Preventive Screenings Covered by Medicare:  Abdominal Aortic Aneurysm (AAA) Screening: covered once if your at risk. You're considered to be at risk if you have a family history of AAA or a male between the age of 70-76 who smoking at least 100 cigarettes in your lifetime. Lung Cancer Screening: covers low dose CT scan once per year if you meet all of the following conditions: (1) Age 48-67; (2) No signs or symptoms of lung cancer; (3) Current smoker or have quit smoking within the last 15 years; (4) You have a tobacco smoking history of at least 20 pack years (packs per day x number of years you smoked); (5) You get a written order from a healthcare provider. Glaucoma Screening: covered annually if you're considered high risk: (1) You have diabetes OR (2) Family history of glaucoma OR (3)  aged 48 and older OR (3)  American aged 72 and older  Osteoporosis Screening: covered every 2 years if you meet one of the following conditions: (1) Have a vertebral abnormality; (2) On glucocorticoid therapy for more than 3 months; (3) Have primary hyperparathyroidism; (4) On osteoporosis medications and need to assess response to drug therapy. HIV Screening: covered annually if you're between the age of 14-79. Also covered annually if you are younger than 13 and older than 72 with risk factors for HIV infection. For pregnant patients, it is covered up to 3 times per pregnancy.     Immunizations:  Immunization Recommendations   Influenza Vaccine Annual influenza vaccination during flu season is recommended for all persons aged >= 6 months who do not have contraindications   Pneumococcal Vaccine   * Pneumococcal conjugate vaccine = PCV13 (Prevnar 13), PCV15 (Vaxneuvance), PCV20 (Prevnar 20)  * Pneumococcal polysaccharide vaccine = PPSV23 (Pneumovax) Adults 70-47 yo with certain risk factors or if 69+ yo  If never received any pneumonia vaccine: recommend Prevnar 20 (PCV20)  Give PCV20 if previously received 1 dose of PCV13 or PPSV23   Hepatitis B Vaccine 3 dose series if at intermediate or high risk (ex: diabetes, end stage renal disease, liver disease)   Respiratory syncytial virus (RSV) Vaccine - COVERED BY MEDICARE PART D  * RSVPreF3 (Arexvy) CDC recommends that adults 61years of age and older may receive a single dose of RSV vaccine using shared clinical decision-making (SCDM)   Tetanus (Td) Vaccine - COST NOT COVERED BY MEDICARE PART B Following completion of primary series, a booster dose should be given every 10 years to maintain immunity against tetanus. Td may also be given as tetanus wound prophylaxis. Tdap Vaccine - COST NOT COVERED BY MEDICARE PART B Recommended at least once for all adults. For pregnant patients, recommended with each pregnancy. Shingles Vaccine (Shingrix) - COST NOT COVERED BY MEDICARE PART B  2 shot series recommended in those 23 years and older who have or will have weakened immune systems or those 50 years and older     Health Maintenance Due:      Topic Date Due   • Colorectal Cancer Screening  01/11/2024   • HIV Screening  Completed   • Hepatitis C Screening  Completed     Immunizations Due:      Topic Date Due   • COVID-19 Vaccine (4 - Pfizer series) 03/24/2022   • Pneumococcal Vaccine: 65+ Years (1 - PCV) Never done   • Influenza Vaccine (1) Never done     Advance Directives   What are advance directives? Advance directives are legal documents that state your wishes and plans for medical care. These plans are made ahead of time in case you lose your ability to make decisions for yourself.  Advance directives can apply to any medical decision, such as the treatments you want, and if you want to donate organs. What are the types of advance directives? There are many types of advance directives, and each state has rules about how to use them. You may choose a combination of any of the following:  Living will: This is a written record of the treatment you want. You can also choose which treatments you do not want, which to limit, and which to stop at a certain time. This includes surgery, medicine, IV fluid, and tube feedings. Durable power of  for healthcare [facility]): This is a written record that states who you want to make healthcare choices for you when you are unable to make them for yourself. This person, called a proxy, is usually a family member or a friend. You may choose more than 1 proxy. Do not resuscitate (DNR) order:  A DNR order is used in case your heart stops beating or you stop breathing. It is a request not to have certain forms of treatment, such as CPR. A DNR order may be included in other types of advance directives. Medical directive: This covers the care that you want if you are in a coma, near death, or unable to make decisions for yourself. You can list the treatments you want for each condition. Treatment may include pain medicine, surgery, blood transfusions, dialysis, IV or tube feedings, and a ventilator (breathing machine). Values history: This document has questions about your views, beliefs, and how you feel and think about life. This information can help others choose the care that you would choose. Why are advance directives important? An advance directive helps you control your care. Although spoken wishes may be used, it is better to have your wishes written down. Spoken wishes can be misunderstood, or not followed. Treatments may be given even if you do not want them. An advance directive may make it easier for your family to make difficult choices about your care.    Weight Management   Why it is important to manage your weight:  Being overweight increases your risk of health conditions such as heart disease, high blood pressure, type 2 diabetes, and certain types of cancer. It can also increase your risk for osteoarthritis, sleep apnea, and other respiratory problems. Aim for a slow, steady weight loss. Even a small amount of weight loss can lower your risk of health problems. How to lose weight safely:  A safe and healthy way to lose weight is to eat fewer calories and get regular exercise. You can lose up about 1 pound a week by decreasing the number of calories you eat by 500 calories each day. Healthy meal plan for weight management:  A healthy meal plan includes a variety of foods, contains fewer calories, and helps you stay healthy. A healthy meal plan includes the following:  Eat whole-grain foods more often. A healthy meal plan should contain fiber. Fiber is the part of grains, fruits, and vegetables that is not broken down by your body. Whole-grain foods are healthy and provide extra fiber in your diet. Some examples of whole-grain foods are whole-wheat breads and pastas, oatmeal, brown rice, and bulgur. Eat a variety of vegetables every day. Include dark, leafy greens such as spinach, kale, layla greens, and mustard greens. Eat yellow and orange vegetables such as carrots, sweet potatoes, and winter squash. Eat a variety of fruits every day. Choose fresh or canned fruit (canned in its own juice or light syrup) instead of juice. Fruit juice has very little or no fiber. Eat low-fat dairy foods. Drink fat-free (skim) milk or 1% milk. Eat fat-free yogurt and low-fat cottage cheese. Try low-fat cheeses such as mozzarella and other reduced-fat cheeses. Choose meat and other protein foods that are low in fat. Choose beans or other legumes such as split peas or lentils. Choose fish, skinless poultry (chicken or turkey), or lean cuts of red meat (beef or pork).  Before you cook meat or poultry, cut off any visible fat.   Use less fat and oil. Try baking foods instead of frying them. Add less fat, such as margarine, sour cream, regular salad dressing and mayonnaise to foods. Eat fewer high-fat foods. Some examples of high-fat foods include french fries, doughnuts, ice cream, and cakes. Eat fewer sweets. Limit foods and drinks that are high in sugar. This includes candy, cookies, regular soda, and sweetened drinks. Exercise:  Exercise at least 30 minutes per day on most days of the week. Some examples of exercise include walking, biking, dancing, and swimming. You can also fit in more physical activity by taking the stairs instead of the elevator or parking farther away from stores. Ask your healthcare provider about the best exercise plan for you. © Copyright [employer] 2018 Information is for End User's use only and may not be sold, redistributed or otherwise used for commercial purposes. All illustrations and images included in CareNotes® are the copyrighted property of [employer] or [facility] Preventive Visit Patient Instructions  Thank you for completing your Welcome to Medicare Visit or Medicare Annual Wellness Visit today. Your next wellness visit will be due in one year (11/3/2024). The screening/preventive services that you may require over the next 5-10 years are detailed below. Some tests may not apply to you based off risk factors and/or age. Screening tests ordered at today's visit but not completed yet may show as past due. Also, please note that scanned in results may not display below.   Preventive Screenings:  Service Recommendations Previous Testing/Comments   Colorectal Cancer Screening  Colonoscopy    Fecal Occult Blood Test (FOBT)/Fecal Immunochemical Test (FIT)  Fecal DNA/Cologuard Test  Flexible Sigmoidoscopy Age: 43-73 years old   Colonoscopy: every 10 years (May be performed more frequently if at higher risk)  OR  FOBT/FIT: every 1 year  OR  Cologuard: every 3 years OR  Sigmoidoscopy: every 5 years  Screening may be recommended earlier than age 39 if at higher risk for colorectal cancer. Also, an individualized decision between you and your healthcare provider will decide whether screening between the ages of 77-80 would be appropriate. Colonoscopy: 01/11/2019  FOBT/FIT: Not on file  Cologuard: Not on file  Sigmoidoscopy: Not on file    Screening Current  Screening Current     Prostate Cancer Screening Individualized decision between patient and health care provider in men between ages of 53-66   Medicare will cover every 12 months beginning on the day after your 50th birthday PSA: 0.7 ng/mL     Screening Current  Screening Current     Hepatitis C Screening Once for adults born between 1945 and 1965  More frequently in patients at high risk for Hepatitis C Hep C Antibody: 02/01/2020    Screening Current  Screening Current   Diabetes Screening 1-2 times per year if you're at risk for diabetes or have pre-diabetes Fasting glucose: 99 mg/dL (11/15/2022)  A1C: 5.4 % (11/15/2022)  Screening Current  Screening Current   Cholesterol Screening Once every 5 years if you don't have a lipid disorder. May order more often based on risk factors. Lipid panel: 11/15/2022  Screening Not Indicated  History Lipid Disorder  Screening Not Indicated  History Lipid Disorder      Other Preventive Screenings Covered by Medicare:  Abdominal Aortic Aneurysm (AAA) Screening: covered once if your at risk. You're considered to be at risk if you have a family history of AAA or a male between the age of 70-76 who smoking at least 100 cigarettes in your lifetime.   Lung Cancer Screening: covers low dose CT scan once per year if you meet all of the following conditions: (1) Age 48-67; (2) No signs or symptoms of lung cancer; (3) Current smoker or have quit smoking within the last 15 years; (4) You have a tobacco smoking history of at least 20 pack years (packs per day x number of years you smoked); (5) You get a written order from a healthcare provider. Glaucoma Screening: covered annually if you're considered high risk: (1) You have diabetes OR (2) Family history of glaucoma OR (3)  aged 48 and older OR (3)  American aged 72 and older  Osteoporosis Screening: covered every 2 years if you meet one of the following conditions: (1) Have a vertebral abnormality; (2) On glucocorticoid therapy for more than 3 months; (3) Have primary hyperparathyroidism; (4) On osteoporosis medications and need to assess response to drug therapy. HIV Screening: covered annually if you're between the age of 14-79. Also covered annually if you are younger than 13 and older than 72 with risk factors for HIV infection. For pregnant patients, it is covered up to 3 times per pregnancy. Immunizations:  Immunization Recommendations   Influenza Vaccine Annual influenza vaccination during flu season is recommended for all persons aged >= 6 months who do not have contraindications   Pneumococcal Vaccine   * Pneumococcal conjugate vaccine = PCV13 (Prevnar 13), PCV15 (Vaxneuvance), PCV20 (Prevnar 20)  * Pneumococcal polysaccharide vaccine = PPSV23 (Pneumovax) Adults 54-45 yo with certain risk factors or if 69+ yo  If never received any pneumonia vaccine: recommend Prevnar 20 (PCV20)  Give PCV20 if previously received 1 dose of PCV13 or PPSV23   Hepatitis B Vaccine 3 dose series if at intermediate or high risk (ex: diabetes, end stage renal disease, liver disease)   Respiratory syncytial virus (RSV) Vaccine - COVERED BY MEDICARE PART D  * RSVPreF3 (Arexvy) CDC recommends that adults 61years of age and older may receive a single dose of RSV vaccine using shared clinical decision-making (SCDM)   Tetanus (Td) Vaccine - COST NOT COVERED BY MEDICARE PART B Following completion of primary series, a booster dose should be given every 10 years to maintain immunity against tetanus. Td may also be given as tetanus wound prophylaxis. Tdap Vaccine - COST NOT COVERED BY MEDICARE PART B Recommended at least once for all adults. For pregnant patients, recommended with each pregnancy. Shingles Vaccine (Shingrix) - COST NOT COVERED BY MEDICARE PART B  2 shot series recommended in those 23 years and older who have or will have weakened immune systems or those 50 years and older     Health Maintenance Due:      Topic Date Due   • Colorectal Cancer Screening  01/11/2024   • HIV Screening  Completed   • Hepatitis C Screening  Completed     Immunizations Due:      Topic Date Due   • COVID-19 Vaccine (4 - Pfizer series) 03/24/2022   • Pneumococcal Vaccine: 65+ Years (1 - PCV) Never done   • Influenza Vaccine (1) Never done     Advance Directives   What are advance directives? Advance directives are legal documents that state your wishes and plans for medical care. These plans are made ahead of time in case you lose your ability to make decisions for yourself. Advance directives can apply to any medical decision, such as the treatments you want, and if you want to donate organs. What are the types of advance directives? There are many types of advance directives, and each state has rules about how to use them. You may choose a combination of any of the following:  Living will: This is a written record of the treatment you want. You can also choose which treatments you do not want, which to limit, and which to stop at a certain time. This includes surgery, medicine, IV fluid, and tube feedings. Durable power of  for healthcare [facility]): This is a written record that states who you want to make healthcare choices for you when you are unable to make them for yourself. This person, called a proxy, is usually a family member or a friend. You may choose more than 1 proxy. Do not resuscitate (DNR) order:  A DNR order is used in case your heart stops beating or you stop breathing.  It is a request not to have certain forms of treatment, such as CPR. A DNR order may be included in other types of advance directives. Medical directive: This covers the care that you want if you are in a coma, near death, or unable to make decisions for yourself. You can list the treatments you want for each condition. Treatment may include pain medicine, surgery, blood transfusions, dialysis, IV or tube feedings, and a ventilator (breathing machine). Values history: This document has questions about your views, beliefs, and how you feel and think about life. This information can help others choose the care that you would choose. Why are advance directives important? An advance directive helps you control your care. Although spoken wishes may be used, it is better to have your wishes written down. Spoken wishes can be misunderstood, or not followed. Treatments may be given even if you do not want them. An advance directive may make it easier for your family to make difficult choices about your care. Weight Management   Why it is important to manage your weight:  Being overweight increases your risk of health conditions such as heart disease, high blood pressure, type 2 diabetes, and certain types of cancer. It can also increase your risk for osteoarthritis, sleep apnea, and other respiratory problems. Aim for a slow, steady weight loss. Even a small amount of weight loss can lower your risk of health problems. How to lose weight safely:  A safe and healthy way to lose weight is to eat fewer calories and get regular exercise. You can lose up about 1 pound a week by decreasing the number of calories you eat by 500 calories each day. Healthy meal plan for weight management:  A healthy meal plan includes a variety of foods, contains fewer calories, and helps you stay healthy. A healthy meal plan includes the following:  Eat whole-grain foods more often. A healthy meal plan should contain fiber.  Fiber is the part of grains, fruits, and vegetables that is not broken down by your body. Whole-grain foods are healthy and provide extra fiber in your diet. Some examples of whole-grain foods are whole-wheat breads and pastas, oatmeal, brown rice, and bulgur. Eat a variety of vegetables every day. Include dark, leafy greens such as spinach, kale, layla greens, and mustard greens. Eat yellow and orange vegetables such as carrots, sweet potatoes, and winter squash. Eat a variety of fruits every day. Choose fresh or canned fruit (canned in its own juice or light syrup) instead of juice. Fruit juice has very little or no fiber. Eat low-fat dairy foods. Drink fat-free (skim) milk or 1% milk. Eat fat-free yogurt and low-fat cottage cheese. Try low-fat cheeses such as mozzarella and other reduced-fat cheeses. Choose meat and other protein foods that are low in fat. Choose beans or other legumes such as split peas or lentils. Choose fish, skinless poultry (chicken or turkey), or lean cuts of red meat (beef or pork). Before you cook meat or poultry, cut off any visible fat. Use less fat and oil. Try baking foods instead of frying them. Add less fat, such as margarine, sour cream, regular salad dressing and mayonnaise to foods. Eat fewer high-fat foods. Some examples of high-fat foods include french fries, doughnuts, ice cream, and cakes. Eat fewer sweets. Limit foods and drinks that are high in sugar. This includes candy, cookies, regular soda, and sweetened drinks. Exercise:  Exercise at least 30 minutes per day on most days of the week. Some examples of exercise include walking, biking, dancing, and swimming. You can also fit in more physical activity by taking the stairs instead of the elevator or parking farther away from stores. Ask your healthcare provider about the best exercise plan for you. © Copyright [employer] 2018 Information is for End User's use only and may not be sold, redistributed or otherwise used for commercial purposes.  All illustrations and images included in [facility] are the copyrighted property of [employer] or [address] [place] [address]

## 2023-11-03 NOTE — PROGRESS NOTES
Assessment and Plan:     Problem List Items Addressed This Visit          Endocrine    Impaired fasting glucose    Relevant Orders    Hemoglobin A1C       Cardiovascular and Mediastinum    CAD S/P percutaneous coronary angioplasty    Relevant Medications    nitroglycerin (NITROSTAT) 0.4 mg SL tablet    Essential (primary) hypertension    Relevant Medications    lisinopril (ZESTRIL) 2.5 mg tablet    Other Relevant Orders    Comprehensive metabolic panel    CBC and differential       Musculoskeletal and Integument    Primary osteoarthritis of both knees    Relevant Orders    Ambulatory Referral to Orthopedic Surgery       Other    Mixed hyperlipidemia    Relevant Medications    simvastatin (ZOCOR) 40 mg tablet    Other Relevant Orders    Lipid panel     Other Visit Diagnoses       Welcome to Medicare preventive visit    -  Primary    Essential hypertension        Relevant Medications    lisinopril (ZESTRIL) 2.5 mg tablet    Hypercholesteremia        Relevant Medications    simvastatin (ZOCOR) 40 mg tablet    Hyperuricemia        Relevant Medications    allopurinol (ZYLOPRIM) 300 mg tablet    IFG (impaired fasting glucose)        Screening for prostate cancer        Relevant Orders    PSA, Total Screen    Low libido        Relevant Orders    Testosterone            Depression Screening and Follow-up Plan: Patient was screened for depression during today's encounter. They screened negative with a PHQ-2 score of 2. Preventive health issues were discussed with patient, and age appropriate screening tests were ordered as noted in patient's After Visit Summary. Personalized health advice and appropriate referrals for health education or preventive services given if needed, as noted in patient's After Visit Summary.      History of Present Illness:     Patient presents for a Medicare Wellness Visit    HPI   Patient Care Team:  Popeye Stinson MD as PCP - Sis Gandara MD as Endoscopist     Review of Systems:     Review of Systems     Problem List:     Patient Active Problem List   Diagnosis    CAD S/P percutaneous coronary angioplasty    Essential (primary) hypertension    Mixed hyperlipidemia    Vitamin D deficiency    Male erectile disorder    Impaired fasting glucose    Primary osteoarthritis of both knees      Past Medical and Surgical History:     Past Medical History:   Diagnosis Date    CAD (coronary artery disease)     s/p stents    Chronic idiopathic gout 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 tophus 3/18/2015    Hyperlipidemia     Hypertension      Past Surgical History:   Procedure Laterality Date    CORONARY ANGIOPLASTY WITH STENT PLACEMENT      HEMORROIDECTOMY      HERNIA REPAIR      DE COLONOSCOPY FLX DX W/COLLJ SPEC WHEN PFRMD N/A 2019    Procedure: COLONOSCOPY;  Surgeon: Alan Cyr MD;  Location[facility];   Service: Colorectal      Family History:     Family History   Problem Relation Age of Onset    Coronary artery disease Mother       Social History:     Social History     Socioeconomic History    Marital status: /Civil Union     Spouse name: None    Number of children: None    Years of education: None    Highest education level: None   Occupational History    None   Tobacco Use    Smoking status: Former     Packs/day: 3.00     Years: 10.00     Total pack years: 30.00     Types: Cigarettes     Quit date: 1989     Years since quittin.8    Smokeless tobacco: Never   Vaping Use    Vaping Use: Never used   Substance and Sexual Activity    Alcohol use: Yes     Comment: 1 per week    Drug use: No    Sexual activity: Yes     Partners: Female     Birth control/protection: None   Other Topics Concern    None   Social History Narrative    None     Social Determinants of Health     Financial Resource Strain: Unknown (10/27/2023)    Overall Financial Resource Strain (CARDIA)     Difficulty of Paying Living Expenses: Patient refused   Food Insecurity: Not on file Transportation Needs: No Transportation Needs (10/27/2023)    PRAPARE - Transportation     Lack of Transportation (Medical): No     Lack of Transportation (Non-Medical): No   Physical Activity: Not on file   Stress: Not on file   Social Connections: Not on file   Intimate Partner Violence: Not on file   Housing Stability: Not on file      Medications and Allergies:     Current Outpatient Medications   Medication Sig Dispense Refill    allopurinol (ZYLOPRIM) 300 mg tablet Take 1 tablet (300 mg total) by mouth daily 90 tablet 3    aspirin 81 MG tablet Take 81 mg by mouth daily      lisinopril (ZESTRIL) 2.5 mg tablet Take 1 tablet (2.5 mg total) by mouth daily 90 tablet 3    Multiple Vitamins-Minerals (MENS 50+ MULTI VITAMIN/MIN PO) Take 1 tablet by mouth every morning      nitroglycerin (NITROSTAT) 0.4 mg SL tablet Place 1 tablet (0.4 mg total) under the tongue every 5 (five) minutes as needed for chest pain 25 tablet 1    simvastatin (ZOCOR) 40 mg tablet Take 1 tablet (40 mg total) by mouth daily 90 tablet 3    tadalafil (CIALIS) 20 MG tablet Take 1 tablet (20 mg total) by mouth daily as needed for erectile dysfunction 10 tablet 3     No current facility-administered medications for this visit. No Known Allergies   Immunizations:     Immunization History   Administered Date(s) Administered    COVID-19 PFIZER VACCINE 0.3 ML IM 05/27/2021, 06/22/2021    COVID-19 Pfizer vac (Piter-sucrose, gray cap) 12 yr+ IM 01/27/2022    Tdap 02/01/2017      Health Maintenance:         Topic Date Due    Colorectal Cancer Screening  01/11/2024    HIV Screening  Completed    Hepatitis C Screening  Completed         Topic Date Due    COVID-19 Vaccine (4 - Pfizer series) 03/24/2022    Pneumococcal Vaccine: 65+ Years (1 - PCV) Never done    Influenza Vaccine (1) Never done      Medicare Screening Tests and Risk Assessments:     Beverley Medel is here for his Subsequent Wellness visit.      Health Risk Assessment:   Patient rates overall health as fair. Patient feels that their physical health rating is slightly better. Patient is satisfied with their life. Eyesight was rated as slightly worse. Hearing was rated as same. Patient feels that their emotional and mental health rating is same. Patients states they are sometimes angry. Patient states they are sometimes unusually tired/fatigued. Pain experienced in the last 7 days has been some. Patient's pain rating has been 7/10. Patient states that he has experienced no weight loss or gain in last 6 months. Lost 40lbs changed diet and going to the gym. Depression Screening:   PHQ-2 Score: 2      Fall Risk Screening: In the past year, patient has experienced: no history of falling in past year      Home Safety:  Patient does not have trouble with stairs inside or outside of their home. Patient has working smoke alarms and has working carbon monoxide detector. Home safety hazards include: none. Nutrition:   Current diet is Low Carb, No Added Salt and Limited junk food. Medications:   Patient is currently taking over-the-counter supplements. OTC medications include: see medication list. Patient is able to manage medications. Activities of Daily Living (ADLs)/Instrumental Activities of Daily Living (IADLs):   Walk and transfer into and out of bed and chair?: Yes  Dress and groom yourself?: Yes    Bathe or shower yourself?: Yes    Feed yourself?  Yes  Do your laundry/housekeeping?: Yes  Manage your money, pay your bills and track your expenses?: Yes  Make your own meals?: Yes    Do your own shopping?: Yes    Durable Medical Equipment Suppliers  None    Previous Hospitalizations:   Any hospitalizations or ED visits within the last 12 months?: No      Advance Care Planning:   Living will: Yes    Durable POA for healthcare: No    Advanced directive: Yes      PREVENTIVE SCREENINGS      Cardiovascular Screening:    General: Screening Not Indicated and History Lipid Disorder      Diabetes Screening: General: Screening Current      Colorectal Cancer Screening:     General: Screening Current      Prostate Cancer Screening:    General: Screening Current      Abdominal Aortic Aneurysm (AAA) Screening:    Risk factors include: age between 70-75 yo and tobacco use        Lung Cancer Screening:     General: Screening Not Indicated      Hepatitis C Screening:    General: Screening Current    Screening, Brief Intervention, and Referral to Treatment (SBIRT)    Screening  Typical number of drinks in a day: 0  Typical number of drinks in a week: 1  Interpretation: Low risk drinking behavior. AUDIT-C Screenin) How often did you have a drink containing alcohol in the past year? 2 to 4 times a month  2) How many drinks did you have on a typical day when you were drinking in the past year?  1 to 2  3) How often did you have 6 or more drinks on one occasion in the past year? less than monthly    AUDIT-C Score: 3  Interpretation: Score 0-3 (male): Negative screen for alcohol misuse    Single Item Drug Screening:  How often have you used an illegal drug (including marijuana) or a prescription medication for non-medical reasons in the past year? never    Single Item Drug Screen Score: 0  Interpretation: Negative screen for possible drug use disorder    Hearing Screening    500Hz 1000Hz 2000Hz 4000Hz   Right ear Pass Pass failed Pass   Left ear Pass Pass failed Pass     Vision Screening    Right eye Left eye Both eyes   Without correction 20/25 20/25 20/20   With correction           Physical Exam:     /72 (BP Location: Left arm, Patient Position: Sitting, Cuff Size: Large)   Pulse 87   Temp 97.9 °F (36.6 °C)   Resp 18   Ht 6' 1.5" (1.867 m)   Wt 97.5 kg (215 lb)   SpO2 100%   BMI 27.98 kg/m²     Physical Exam     Popeye Stinson MD

## 2023-11-07 ENCOUNTER — APPOINTMENT (OUTPATIENT)
Dept: LAB | Facility: CLINIC | Age: 63
End: 2023-11-07
Payer: MEDICARE

## 2023-11-07 DIAGNOSIS — R68.82 LOW LIBIDO: ICD-10-CM

## 2023-11-07 DIAGNOSIS — Z12.5 SCREENING FOR PROSTATE CANCER: ICD-10-CM

## 2023-11-07 LAB
ALBUMIN SERPL BCP-MCNC: 4.4 G/DL (ref 3.5–5)
ALP SERPL-CCNC: 59 U/L (ref 34–104)
ALT SERPL W P-5'-P-CCNC: 29 U/L (ref 7–52)
ANION GAP SERPL CALCULATED.3IONS-SCNC: 10 MMOL/L
AST SERPL W P-5'-P-CCNC: 26 U/L (ref 13–39)
BASOPHILS # BLD AUTO: 0.04 THOUSANDS/ÂΜL (ref 0–0.1)
BASOPHILS NFR BLD AUTO: 1 % (ref 0–1)
BILIRUB SERPL-MCNC: 0.65 MG/DL (ref 0.2–1)
BUN SERPL-MCNC: 16 MG/DL (ref 5–25)
CALCIUM SERPL-MCNC: 9.7 MG/DL (ref 8.4–10.2)
CHLORIDE SERPL-SCNC: 105 MMOL/L (ref 96–108)
CHOLEST SERPL-MCNC: 134 MG/DL
CO2 SERPL-SCNC: 29 MMOL/L (ref 21–32)
CREAT SERPL-MCNC: 0.81 MG/DL (ref 0.6–1.3)
EOSINOPHIL # BLD AUTO: 0.1 THOUSAND/ÂΜL (ref 0–0.61)
EOSINOPHIL NFR BLD AUTO: 2 % (ref 0–6)
ERYTHROCYTE [DISTWIDTH] IN BLOOD BY AUTOMATED COUNT: 13.4 % (ref 11.6–15.1)
EST. AVERAGE GLUCOSE BLD GHB EST-MCNC: 117 MG/DL
GFR SERPL CREATININE-BSD FRML MDRD: 93 ML/MIN/1.73SQ M
GLUCOSE P FAST SERPL-MCNC: 98 MG/DL (ref 65–99)
HBA1C MFR BLD: 5.7 %
HCT VFR BLD AUTO: 45.2 % (ref 36.5–49.3)
HDLC SERPL-MCNC: 49 MG/DL
HGB BLD-MCNC: 15.3 G/DL (ref 12–17)
IMM GRANULOCYTES # BLD AUTO: 0 THOUSAND/UL (ref 0–0.2)
IMM GRANULOCYTES NFR BLD AUTO: 0 % (ref 0–2)
LDLC SERPL CALC-MCNC: 66 MG/DL (ref 0–100)
LYMPHOCYTES # BLD AUTO: 2.05 THOUSANDS/ÂΜL (ref 0.6–4.47)
LYMPHOCYTES NFR BLD AUTO: 35 % (ref 14–44)
MCH RBC QN AUTO: 31.3 PG (ref 26.8–34.3)
MCHC RBC AUTO-ENTMCNC: 33.8 G/DL (ref 31.4–37.4)
MCV RBC AUTO: 92 FL (ref 82–98)
MONOCYTES # BLD AUTO: 0.44 THOUSAND/ÂΜL (ref 0.17–1.22)
MONOCYTES NFR BLD AUTO: 8 % (ref 4–12)
NEUTROPHILS # BLD AUTO: 3.23 THOUSANDS/ÂΜL (ref 1.85–7.62)
NEUTS SEG NFR BLD AUTO: 54 % (ref 43–75)
NONHDLC SERPL-MCNC: 85 MG/DL
NRBC BLD AUTO-RTO: 0 /100 WBCS
PLATELET # BLD AUTO: 179 THOUSANDS/UL (ref 149–390)
PMV BLD AUTO: 11.6 FL (ref 8.9–12.7)
POTASSIUM SERPL-SCNC: 4.7 MMOL/L (ref 3.5–5.3)
PROT SERPL-MCNC: 7.2 G/DL (ref 6.4–8.4)
PSA SERPL-MCNC: 0.58 NG/ML (ref 0–4)
RBC # BLD AUTO: 4.89 MILLION/UL (ref 3.88–5.62)
SODIUM SERPL-SCNC: 144 MMOL/L (ref 135–147)
TESTOST SERPL-MSCNC: 236 NG/DL
TRIGL SERPL-MCNC: 93 MG/DL
WBC # BLD AUTO: 5.86 THOUSAND/UL (ref 4.31–10.16)

## 2023-11-07 PROCEDURE — G0103 PSA SCREENING: HCPCS

## 2023-11-07 PROCEDURE — 36415 COLL VENOUS BLD VENIPUNCTURE: CPT

## 2023-11-07 PROCEDURE — 83036 HEMOGLOBIN GLYCOSYLATED A1C: CPT | Performed by: FAMILY MEDICINE

## 2023-11-07 PROCEDURE — 85025 COMPLETE CBC W/AUTO DIFF WBC: CPT | Performed by: FAMILY MEDICINE

## 2023-11-07 PROCEDURE — 80061 LIPID PANEL: CPT | Performed by: FAMILY MEDICINE

## 2023-11-07 PROCEDURE — 80053 COMPREHEN METABOLIC PANEL: CPT | Performed by: FAMILY MEDICINE

## 2023-11-08 ENCOUNTER — TELEPHONE (OUTPATIENT)
Dept: FAMILY MEDICINE CLINIC | Facility: CLINIC | Age: 63
End: 2023-11-08

## 2023-11-08 DIAGNOSIS — Z13.6 SCREENING FOR AAA (AORTIC ABDOMINAL ANEURYSM): Primary | ICD-10-CM

## 2023-11-21 ENCOUNTER — HOSPITAL ENCOUNTER (OUTPATIENT)
Dept: RADIOLOGY | Facility: MEDICAL CENTER | Age: 63
Discharge: HOME | End: 2023-11-21
Payer: MEDICARE

## 2023-11-21 DIAGNOSIS — Z13.6 SCREENING FOR AAA (AORTIC ABDOMINAL ANEURYSM): ICD-10-CM

## 2023-11-21 PROCEDURE — 76706 US ABDL AORTA SCREEN AAA: CPT

## 2024-01-19 ENCOUNTER — OFFICE VISIT (OUTPATIENT)
Dept: OBGYN CLINIC | Facility: MEDICAL CENTER | Age: 64
End: 2024-01-19
Payer: MEDICARE

## 2024-01-19 ENCOUNTER — APPOINTMENT (OUTPATIENT)
Dept: RADIOLOGY | Facility: MEDICAL CENTER | Age: 64
End: 2024-01-19
Payer: MEDICARE

## 2024-01-19 VITALS
DIASTOLIC BLOOD PRESSURE: 83 MMHG | HEIGHT: 73 IN | HEART RATE: 84 BPM | WEIGHT: 205 LBS | SYSTOLIC BLOOD PRESSURE: 147 MMHG | BODY MASS INDEX: 27.17 KG/M2

## 2024-01-19 DIAGNOSIS — M17.0 PRIMARY OSTEOARTHRITIS OF BOTH KNEES: ICD-10-CM

## 2024-01-19 DIAGNOSIS — M13.162 INFLAMMATION OF JOINT OF LEFT KNEE: ICD-10-CM

## 2024-01-19 DIAGNOSIS — M76.892 HAMSTRING TENDONITIS OF LEFT THIGH: Primary | ICD-10-CM

## 2024-01-19 PROCEDURE — 73560 X-RAY EXAM OF KNEE 1 OR 2: CPT

## 2024-01-19 PROCEDURE — 99204 OFFICE O/P NEW MOD 45 MIN: CPT | Performed by: ORTHOPAEDIC SURGERY

## 2024-01-19 NOTE — PROGRESS NOTES
Assessment:   Diagnosis ICD-10-CM Associated Orders   1. Hamstring tendonitis of left thigh  M76.892       2. Primary osteoarthritis of both knees  M17.0 XR knee 1 or 2 vw right     XR knee 1 or 2 vw left     Ambulatory Referral to Orthopedic Surgery      3. Inflammation of joint of left knee  M13.162           Plan:  Diagnostics reviewed and physical exam performed.  Diagnosis, treatment options and associated risks were discussed with the patient including no treatment, nonsurgical treatment and potential for surgical intervention.  The patient was given the opportunity to ask questions regarding each.  It was explained to the patient that based upon the clinical and radiographic findings, at this point in time, this is not a surgical problem.  Treatment for the above diagnoses and associated symptoms of this nature is generally conservative including a physician directed physical therapy program, improved fitness/weight loss, anti-inflammatories, and potentially various injections.  His complaints are atypical for advanced left knee osteoarthritis despite his x-rays showing advanced degenerative changes.  It appears that he is getting relief with the cortisone injections most recently administered 2 weeks ago.  His IT band and distal hamstring tendinosis appear to be aggravated and recommend an oral anti-inflammatory in which a prescription of Relafen will be sent to his [facility] pharmacy on file.  Weightbearing and activities as tolerated.  Follow-up in 2 months for repeat evaluation    To do next visit:  Return for re-check in 2-3 months, or sooner if symptoms worsen or fail to improve.    The above stated was discussed in layman's terms and the patient expressed understanding.  All questions were answered to the patient's satisfaction.       Scribe Attestation      I,:  Vasiliy Acharya am acting as a scribe while in the presence of the attending physician.:       I,:  Denis Fraire MD personally performed the  services described in this documentation    as scribed in my presence.:               Subjective:   Rupesh Doyle is a 63 y.o. male who presents today for initial evaluation of his left leg due to pain.  His pain is laterally and starts in his buttock and will radiate to the lateral aspect of his lower extremity.  He has been diagnosed with osteoarthritis and IT band syndrome.  He has been receiving injections of cortisone and lubrication to both knees his last being cortisone 2 weeks ago with improvement of his knee related symptoms.  He has had 2 courses of physical therapy, one for his knee and the other for his back and IT band.  Treatment through [facility].  He describes burning pain.  Denies any numbness or tingling.  Denies any left groin pain.  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  He did take celebrex for a month with improvement of his symptoms.  He does enjoy walking/hiking.  He tried orthotic shoe inserts which made his left knee symptoms worse.     He has a history of gout.     Review of systems negative unless otherwise specified in HPI  Review of Systems    Past Medical History:   Diagnosis Date    CAD (coronary artery disease)     s/p stents    Chronic idiopathic gout 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 tophus 3/18/2015    Hyperlipidemia     Hypertension        Past Surgical History:   Procedure Laterality Date    CORONARY ANGIOPLASTY WITH STENT PLACEMENT      HEMORROIDECTOMY      HERNIA REPAIR      MI COLONOSCOPY FLX DX W/COLLJ SPEC WHEN PFRMD N/A 1/11/2019    Procedure: COLONOSCOPY;  Surgeon: Suzanna Clayton MD;  Location: [facility];  Service: Colorectal       Family History   Problem Relation Age of Onset    Coronary artery disease Mother        Social History     Occupational History    Not on file   Tobacco Use    Smoking status: Former     Current packs/day: 0.00     Average  packs/day: 3.0 packs/day for 10.0 years (30.0 ttl pk-yrs)     Types: Cigarettes     Start date: 1979     Quit date: 1989     Years since quittin.0    Smokeless tobacco: Never   Vaping Use    Vaping status: Never Used   Substance and Sexual Activity    Alcohol use: Yes     Comment: 1 per week    Drug use: No    Sexual activity: Yes     Partners: Female     Birth control/protection: None         Current Outpatient Medications:     allopurinol (ZYLOPRIM) 300 mg tablet, Take 1 tablet (300 mg total) by mouth daily, Disp: 90 tablet, Rfl: 3    aspirin 81 MG tablet, Take 81 mg by mouth daily, Disp: , Rfl:     lisinopril (ZESTRIL) 2.5 mg tablet, Take 1 tablet (2.5 mg total) by mouth daily, Disp: 90 tablet, Rfl: 3    Multiple Vitamins-Minerals (MENS 50+ MULTI VITAMIN/MIN PO), Take 1 tablet by mouth every morning, Disp: , Rfl:     nitroglycerin (NITROSTAT) 0.4 mg SL tablet, Place 1 tablet (0.4 mg total) under the tongue every 5 (five) minutes as needed for chest pain, Disp: 25 tablet, Rfl: 1    simvastatin (ZOCOR) 40 mg tablet, Take 1 tablet (40 mg total) by mouth daily, Disp: 90 tablet, Rfl: 3    tadalafil (CIALIS) 20 MG tablet, Take 1 tablet (20 mg total) by mouth daily as needed for erectile dysfunction, Disp: 10 tablet, Rfl: 3    No Known Allergies         Vitals:    24 1421   BP: 147/83   Pulse: 84       Body mass index is 27.05 kg/m².  Wt Readings from Last 3 Encounters:   24 93 kg (205 lb)   23 97.5 kg (215 lb)   22 119 kg (262 lb)       Objective:                    Right Knee Exam     Tenderness   The patient is experiencing no tenderness.     Range of Motion   Extension:  0   Flexion:  120 (With crepitation and stiffness of full flexion)     Other   Erythema: absent  Sensation: normal  Swelling: mild    Comments:    Deformity within his patellar tendon without overlying skin changes, fluctuance or warmth.  Nontender      Left Knee Exam     Tenderness   Left knee tenderness  "location: biceps femoris tendon.    Range of Motion   Extension:  0   Flexion:  100 (With crepitation limited by stiffness)     Other   Erythema: absent  Sensation: normal  Swelling: mild  Effusion: effusion (trace) present    Comments:    Both lower extremities are in modest varus alignment with bony enlargement medially.            Diagnostics, reviewed and taken today if performed as documented:    The attending physician has personally reviewed the pertinent films in PACS and interpretation is as follows:    Right and left knee x-rays taken and reviewed in the office today and show: Advanced degenerative changes of both medial and patellofemoral compartments of both knees with bone-on-bone opposition.  Subchondral sclerosis, osteophyte formation present as well as a varus deformity noted to both knees.  Calcific density within the right patellar tendon noted.      Procedures, if performed today:    Procedures    None performed      Portions of the record may have been created with voice recognition software.  Occasional wrong word or \"sound a like\" substitutions may have occurred due to the inherent limitations of voice recognition software.  Read the chart carefully and recognize, using context, where substitutions have occurred.  "

## 2024-01-23 ENCOUNTER — TELEPHONE (OUTPATIENT)
Dept: OBGYN CLINIC | Facility: MEDICAL CENTER | Age: 64
End: 2024-01-23

## 2024-01-23 DIAGNOSIS — M17.0 PRIMARY OSTEOARTHRITIS OF BOTH KNEES: Primary | ICD-10-CM

## 2024-01-23 RX ORDER — NABUMETONE 500 MG/1
500 TABLET, FILM COATED ORAL 2 TIMES DAILY
Qty: 60 TABLET | Refills: 2 | Status: SHIPPED | OUTPATIENT
Start: 2024-01-23

## 2024-01-23 NOTE — TELEPHONE ENCOUNTER
Caller: MR Doyle     Doctor: Dr Fraire     Reason for call: The patient was in on 1/19 and was told : [facility]   was going to be called into:    [id number] [place] - [address]  [address], [place] [address]  Phone: [phone]  Fax: [phone]  [facility] #: [id number]     They are telling him it is not there. Thank you      Call back#: [phone]

## 2024-03-22 ENCOUNTER — OFFICE VISIT (OUTPATIENT)
Dept: OBGYN CLINIC | Facility: MEDICAL CENTER | Age: 64
End: 2024-03-22
Payer: MEDICARE

## 2024-03-22 ENCOUNTER — APPOINTMENT (OUTPATIENT)
Dept: RADIOLOGY | Facility: MEDICAL CENTER | Age: 64
End: 2024-03-22
Payer: MEDICARE

## 2024-03-22 VITALS
BODY MASS INDEX: 27.17 KG/M2 | WEIGHT: 205 LBS | HEART RATE: 80 BPM | DIASTOLIC BLOOD PRESSURE: 84 MMHG | HEIGHT: 73 IN | SYSTOLIC BLOOD PRESSURE: 140 MMHG

## 2024-03-22 DIAGNOSIS — G89.29 CHRONIC PAIN OF LEFT KNEE: ICD-10-CM

## 2024-03-22 DIAGNOSIS — M79.605 LEFT LEG PAIN: ICD-10-CM

## 2024-03-22 DIAGNOSIS — M54.16 LEFT LUMBAR RADICULOPATHY: Primary | ICD-10-CM

## 2024-03-22 DIAGNOSIS — M25.562 CHRONIC PAIN OF LEFT KNEE: ICD-10-CM

## 2024-03-22 DIAGNOSIS — M17.12 PRIMARY OSTEOARTHRITIS OF LEFT KNEE: ICD-10-CM

## 2024-03-22 PROCEDURE — 99214 OFFICE O/P EST MOD 30 MIN: CPT | Performed by: ORTHOPAEDIC SURGERY

## 2024-03-22 PROCEDURE — 73502 X-RAY EXAM HIP UNI 2-3 VIEWS: CPT

## 2024-03-22 RX ORDER — METHYLPREDNISOLONE 4 MG/1
TABLET ORAL
Qty: 21 TABLET | Refills: 0 | Status: SHIPPED | OUTPATIENT
Start: 2024-03-22

## 2024-03-22 NOTE — PATIENT INSTRUCTIONS
Diagnosis ICD-10-CM Associated Orders   1. Left lumbar radiculopathy  M54.16 Ambulatory referral to Spine & Pain Management      2. Primary osteoarthritis of left knee  M17.12       3. Chronic pain of left knee  M25.562     G89.29       4. Left leg pain  M79.605 XR hip/pelv 2-3 vws left if performed          Return if symptoms worsen or fail to improve, for re-check on an as needed basis (PRN), follow-up with Spine and Pain.

## 2024-03-22 NOTE — PROGRESS NOTES
Assessment:   Diagnosis ICD-10-CM Associated Orders   1. Left lumbar radiculopathy  M54.16 Ambulatory referral to Spine & Pain Management      2. Primary osteoarthritis of left knee  M17.12       3. Chronic pain of left knee  M25.562     G89.29       4. Left leg pain  M79.605 XR hip/pelv 2-3 vws left if performed          Plan:  Diagnostics reviewed and physical exam performed.  Diagnosis, treatment options and associated risks were discussed with the patient including no treatment, nonsurgical treatment and potential for surgical intervention.  The patient was given the opportunity to ask questions regarding each.  Modest left hip OA but not the source of his left leg symptoms.  Appears that his left leg symptoms are A-typical for his left knee advanced OA but more as an irritated left L5 nerve root.  Recommend a Medrol Dosepak with follow-up with spine and pain, referrals placed today.  Weightbearing and activities as tolerated.    To do next visit:  Return if symptoms worsen or fail to improve, for re-check on an as needed basis (PRN), follow-up with Spine and Pain.    The above stated was discussed in layman's terms and the patient expressed understanding.  All questions were answered to the patient's satisfaction.       Scribe Attestation      I,:  Vasiliy Acharya am acting as a scribe while in the presence of the attending physician.:       I,:  Denis Fraire MD personally performed the services described in this documentation    as scribed in my presence.:               Subjective:   Rupesh Doyle is a 64 y.o. male who presents today for repeat evaluation of his left lower extremity due to persistent pain.  His pain starts laterally at mid thigh and will radiate as far distal to the level of his ankle.  He had an injection to his left knee through [facility] early in January with improvement of his knee joint symptoms.  He finds that his left leg pain can be severe and unrelenting.  He describes his left  "lower extremity has being weak.  He cannot walk for prolonged periods of time until he needs to sit and rest due to his left leg symptoms.  He at his last visit was prescribed Relafen which she stopped taking due to the feeling \"as if his heart was going to explode\".    He is scheduled for repeat left knee cortisone injection through [facility] next month      Review of systems negative unless otherwise specified in HPI  Review of Systems    Past Medical History:   Diagnosis Date    CAD (coronary artery disease)     s/p stents    Chronic idiopathic gout 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 tophus 3/18/2015    Hyperlipidemia     Hypertension        Past Surgical History:   Procedure Laterality Date    CORONARY ANGIOPLASTY WITH STENT PLACEMENT      HEMORROIDECTOMY      HERNIA REPAIR      MA COLONOSCOPY FLX DX W/COLLJ SPEC WHEN PFRMD N/A 2019    Procedure: COLONOSCOPY;  Surgeon: Suzanna Clayton MD;  Location: [facility];  Service: Colorectal       Family History   Problem Relation Age of Onset    Coronary artery disease Mother        Social History     Occupational History    Not on file   Tobacco Use    Smoking status: Former     Current packs/day: 0.00     Average packs/day: 3.0 packs/day for 10.0 years (30.0 ttl pk-yrs)     Types: Cigarettes     Start date: 1979     Quit date: 1989     Years since quittin.2    Smokeless tobacco: Never   Vaping Use    Vaping status: Never Used   Substance and Sexual Activity    Alcohol use: Yes     Comment: 1 per week    Drug use: No    Sexual activity: Yes     Partners: Female     Birth control/protection: None         Current Outpatient Medications:     allopurinol (ZYLOPRIM) 300 mg tablet, Take 1 tablet (300 mg total) by mouth daily, Disp: 90 tablet, Rfl: 3    aspirin 81 MG tablet, Take 81 mg by mouth daily, Disp: , Rfl:     Multiple Vitamins-Minerals (MENS 50+ MULTI VITAMIN/MIN PO), Take 1 tablet by mouth every morning, Disp: , Rfl:     nabumetone (RELAFEN) 500 " mg tablet, Take 1 tablet (500 mg total) by mouth 2 (two) times a day, Disp: 60 tablet, Rfl: 2    nitroglycerin (NITROSTAT) 0.4 mg SL tablet, Place 1 tablet (0.4 mg total) under the tongue every 5 (five) minutes as needed for chest pain, Disp: 25 tablet, Rfl: 1    simvastatin (ZOCOR) 40 mg tablet, Take 1 tablet (40 mg total) by mouth daily, Disp: 90 tablet, Rfl: 3    tadalafil (CIALIS) 20 MG tablet, Take 1 tablet (20 mg total) by mouth daily as needed for erectile dysfunction, Disp: 10 tablet, Rfl: 3    lisinopril (ZESTRIL) 2.5 mg tablet, Take 1 tablet (2.5 mg total) by mouth daily, Disp: 90 tablet, Rfl: 3    No Known Allergies         Vitals:    03/22/24 0957   BP: 140/84   Pulse: 80       Body mass index is 27.05 kg/m².  Wt Readings from Last 3 Encounters:   03/22/24 93 kg (205 lb)   01/19/24 93 kg (205 lb)   11/03/23 97.5 kg (215 lb)       Objective:                    Left Knee Exam     Muscle Strength   The patient has normal left knee strength.    Tenderness   Left knee tenderness location: biceps femoris tendon.    Range of Motion   Extension:  10   Flexion:  100 (With crepitation and limited by stiffness)     Other   Erythema: absent  Sensation: normal  Swelling: mild  Effusion: effusion (trace) present    Comments:    modest varus alignment with bony enlargement medially.    Small patch of topical dermatitis medially in which he states is poison, non-draining and dry.       Left Hip Exam     Range of Motion   Flexion:  90   External rotation:  20   Internal rotation: 10     Muscle Strength   The patient has normal left hip strength.     Other   Sensation: normal      Back Exam     Other   Sensation: normal  Gait: normal     Comments:  Mild weakness of left tibialis-anterior    (-) seated SLR            Diagnostics, reviewed and taken today if performed as documented:    The attending physician has personally reviewed the pertinent films in PACS and interpretation is as follows:    Left hip and pelvis x-rays  "taken and reviewed in the office today and show: Modest intra-articular degenerative changes of both hips medially with very minimal superior wear.  Normal-appearing femoral head without signs of avascular necrosis or collapse.  Limited visualization of his lower lumbar spine which appears to be unremarkable.      Procedures, if performed today:    Procedures    None performed      Portions of the record may have been created with voice recognition software.  Occasional wrong word or \"sound a like\" substitutions may have occurred due to the inherent limitations of voice recognition software.  Read the chart carefully and recognize, using context, where substitutions have occurred.  "

## 2024-04-04 ENCOUNTER — PREP FOR PROCEDURE (OUTPATIENT)
Age: 64
End: 2024-04-04

## 2024-04-04 ENCOUNTER — TELEPHONE (OUTPATIENT)
Age: 64
End: 2024-04-04

## 2024-04-04 DIAGNOSIS — Z12.11 SCREENING FOR COLON CANCER: Primary | ICD-10-CM

## 2024-04-04 NOTE — TELEPHONE ENCOUNTER
04/04/24  Screened by: Brigid Bailey    Referring Provider Dr. Otto    Pre- Screening:     There is no height or weight on file to calculate BMI.  Has patient been referred for a routine screening Colonoscopy? yes  Is the patient between 45-75 years old? yes      Previous Colonoscopy yes   If yes:    Date: 5 years ago    Facility:     Reason:         Does the patient want to see a Gastroenterologist prior to their procedure OR are they having any GI symptoms? no    Has the patient been hospitalized or had abdominal surgery in the past 6 months? no    Does the patient use supplemental oxygen? no    Does the patient take Coumadin, Lovenox, Plavix, Elliquis, Xarelto, or other blood thinning medication? no    Has the patient had a stroke, cardiac event, or stent placed in the past year? no

## 2024-04-24 ENCOUNTER — EVALUATION (OUTPATIENT)
Dept: PHYSICAL THERAPY | Facility: CLINIC | Age: 64
End: 2024-04-24
Payer: MEDICARE

## 2024-04-24 DIAGNOSIS — M25.561 CHRONIC PAIN OF BOTH KNEES: Primary | ICD-10-CM

## 2024-04-24 DIAGNOSIS — M25.562 CHRONIC PAIN OF BOTH KNEES: Primary | ICD-10-CM

## 2024-04-24 DIAGNOSIS — G89.29 CHRONIC PAIN OF BOTH KNEES: Primary | ICD-10-CM

## 2024-04-24 PROCEDURE — 97162 PT EVAL MOD COMPLEX 30 MIN: CPT | Performed by: PHYSICAL THERAPIST

## 2024-04-24 PROCEDURE — 97112 NEUROMUSCULAR REEDUCATION: CPT | Performed by: PHYSICAL THERAPIST

## 2024-04-24 NOTE — LETTER
2024    Renan Castro MD  [address] [place] [address]    Patient: Rupesh Doyle   YOB: 1960   Date of Visit: 2024     Encounter Diagnosis     ICD-10-CM    1. Chronic pain of both knees  M25.561     M25.562     G89.29           Dear Dr. Castro:    Thank you for your recent referral of Rupesh Doyle. Please review the attached evaluation summary from Rupesh's recent visit.     Please verify that you agree with the plan of care by signing the attached order.     If you have any questions or concerns, please do not hesitate to call.     I sincerely appreciate the opportunity to share in the care of one of your patients and hope to have another opportunity to work with you in the near future.       Sincerely,    Marlon Davis, PT      Referring Provider:      I certify that I have read the below Plan of Care and certify the need for these services furnished under this plan of treatment while under my care.                    Renan Castro MD  [address] [place] [address]  Via Fax: [phone]          PT Evaluation     Today's date: 2024  Patient name: Rupesh Doyle  : 1960  MRN: [id number]  Referring provider: Renan Castro MD  Dx:   Encounter Diagnosis     ICD-10-CM    1. Chronic pain of both knees  M25.561     M25.562     G89.29           Start Time: 1530  Stop Time: 1610  Total time in clinic (min): 40 minutes    Assessment  Assessment details: Rupesh Doyle is a pleasant 64 y.o. male who presents with B/L knee pain.  The patient's greatest concerns are the pain he is experiencing, worry over not knowing what's wrong, and fear of not being able to keep active.      No further referral appears necessary at this time based upon examination results.    Primary movement impairment diagnosis of decreased LE mobility and altered biomechanics resulting in pathoanatomical symptoms of B/L knee pain and limiting his ability to exercise or recreation, get out of  a chair, perform household chores, stand, walk, and perform stair mobility.    Primary Impairments:  1) Decreased LE ROM and mobility   2) Impaired gait mechanics and stair mobility     Etiologic factors include none recalled by the patient.    Impairments: abnormal gait, abnormal or restricted ROM, activity intolerance, impaired physical strength, lacks appropriate home exercise program and pain with function    Symptom irritability: moderateUnderstanding of Dx/Px/POC: good   Prognosis: good  Prognosis details: Positive prognostic indicators include positive attitude toward recovery and good understanding of diagnosis and treatment plan options.  Negative prognostic indicators include chronicity of symptoms and high symptom irritability.      Goals  Patient will be independent with home exercise program.   Patient will be able to manage symptoms independently.     Short Term Goals 2-4 wks   1. Pt will be independent with initial HEP   2. Pt will decrease pain in B/L knees to < 3/10 at worst   3. Pt will improve B/L knee ROM to 0-120*     Long Term Goals 6-8 wks  1. Pt will demonstrate WNL gait mechanics without compensation   2. Pt will be able to perform stair mobility in reciprocal pattern   3. Pt will improve FOTO score to greater than expected by d/c      Plan  Patient would benefit from: skilled physical therapy  Planned modality interventions: Modalities PRN.  Planned therapy interventions: activity modification, manual therapy, neuromuscular re-education, patient education, therapeutic activities, therapeutic exercise, graded activity, home exercise program, behavior modification, self care and joint mobilization  Frequency: 2x week  Duration in weeks: 6  Plan of Care expiration date: 6/5/2024  Treatment plan discussed with: patient        Subjective Evaluation    History of Present Illness  Mechanism of injury: Rupesh Doyle presents with c/c of B/L knee pain, L>R. No specific CRISS noted. Pt seen  previously at this facility for similar concerns and reported relief with PT. Reports symptoms increased in January and had injections which helped. Pt had another serious of injections about 2 wks ago, but continues to have some discomfort in the knees. Primary issue is bending the left knee. Reports he has noticed a decrease in mobility in the left hip and LE. Also reports some radiating symptoms in the posterior hip, lateral thigh, and lateral lower leg. Pt also has referral for pain management in May.   Pain location: B/L knees, L>R, descriptors: sharp and tight  Aggravating factors: extended periods of walking, descending stairs, getting in/out of car  Relieving factors: stretching, exercise, avoiding aggravating positions   24hr pain pattern: 0/10 (current), 0/10 (best), 6-7/10 (worst)   Imaging: X-ray  Previous treatments: injection and previous physical therapy  Occupation/recreation: retired, goes to the gym 3x/wk   Sleeping: no disturbed sleep reported   Patient concerns: decreasing pain, improving mobility, improving function, improving strength, and remaining active  Special Questions: (-) Red flag screening          Objective     Active Range of Motion   Left Knee   Flexion: 90 degrees   Extension: -5 degrees     Right Knee   Normal active range of motion    Additional Active Range of Motion Details  Pain reported with OP into flexion and extension in the left knee     Mobility   Patellar Mobility:   Left Knee   Hypomobile: left medial, left lateral, left superior and left inferior    Right Knee   Hypomobile: medial, lateral, superior and inferior   Tibiofibular Mobility:   Left knee Hypomobile in the anterior and posterior tibiofibular tendon(s).   Right knee Hypomobile in the anterior and posterior tibiofibular tendon(s).     Patellar Static Positioning   Left Knee: WFL  Right Knee: WFL    Strength/Myotome Testing     Left Hip   Normal muscle strength    Right Hip   Normal muscle strength    Left Knee    Normal strength    Right Knee   Normal strength    Additional Strength Details  Pain reported in the left knee with resisted hip flexion and knee flexion     Ambulation     Ambulation: Stairs   Ascend stairs: independent  Pattern: non-reciprocal  Descend stairs: independent  Pattern: non-reciprocal    Observational Gait   Gait: antalgic   Decreased left stance time and left step length.     Quality of Movement During Gait     Knee    Knee (Left): Positive stiff knee.              Diagnosis: Decreased LE Mobility    Precautions: N/A    POC: 4/24-6/5   Authorization: TOAN    Access Code:  [email or web address]   Visit Count 1 2 3 4 5   Manuals 4/24       L Knee/LE Stretching                         Neuro Re-Ed        Quad set         SLR         Bridge         [facility]                 Education  S/S, POC, HEP       There Ex         Active warm up        LAQ        Hip Abd Iso         Hip Add Iso                                Ther Act             Squat         Step up         Lunge                                     Modalities

## 2024-04-24 NOTE — PROGRESS NOTES
PT Evaluation     Today's date: 2024  Patient name: Rupesh Doyle  : 1960  MRN: [id number]  Referring provider: Renan Castro MD  Dx:   Encounter Diagnosis     ICD-10-CM    1. Chronic pain of both knees  M25.561     M25.562     G89.29           Start Time: 1530  Stop Time: 1610  Total time in clinic (min): 40 minutes    Assessment  Assessment details: Rupesh Doyle is a pleasant 64 y.o. male who presents with B/L knee pain.  The patient's greatest concerns are the pain he is experiencing, worry over not knowing what's wrong, and fear of not being able to keep active.      No further referral appears necessary at this time based upon examination results.    Primary movement impairment diagnosis of decreased LE mobility and altered biomechanics resulting in pathoanatomical symptoms of B/L knee pain and limiting his ability to exercise or recreation, get out of a chair, perform household chores, stand, walk, and perform stair mobility.    Primary Impairments:  1) Decreased LE ROM and mobility   2) Impaired gait mechanics and stair mobility     Etiologic factors include none recalled by the patient.    Impairments: abnormal gait, abnormal or restricted ROM, activity intolerance, impaired physical strength, lacks appropriate home exercise program and pain with function    Symptom irritability: moderateUnderstanding of Dx/Px/POC: good   Prognosis: good  Prognosis details: Positive prognostic indicators include positive attitude toward recovery and good understanding of diagnosis and treatment plan options.  Negative prognostic indicators include chronicity of symptoms and high symptom irritability.      Goals  Patient will be independent with home exercise program.   Patient will be able to manage symptoms independently.     Short Term Goals 2-4 wks   1. Pt will be independent with initial HEP   2. Pt will decrease pain in B/L knees to < 3/10 at worst   3. Pt will improve B/L knee ROM to 0-120*     Long  Term Goals 6-8 wks  1. Pt will demonstrate WNL gait mechanics without compensation   2. Pt will be able to perform stair mobility in reciprocal pattern   3. Pt will improve FOTO score to greater than expected by d/c      Plan  Patient would benefit from: skilled physical therapy  Planned modality interventions: Modalities PRN.  Planned therapy interventions: activity modification, manual therapy, neuromuscular re-education, patient education, therapeutic activities, therapeutic exercise, graded activity, home exercise program, behavior modification, self care and joint mobilization  Frequency: 2x week  Duration in weeks: 6  Plan of Care expiration date: 6/5/2024  Treatment plan discussed with: patient        Subjective Evaluation    History of Present Illness  Mechanism of injury: Rupesh Doyle presents with c/c of B/L knee pain, L>R. No specific CRISS noted. Pt seen previously at this facility for similar concerns and reported relief with PT. Reports symptoms increased in January and had injections which helped. Pt had another serious of injections about 2 wks ago, but continues to have some discomfort in the knees. Primary issue is bending the left knee. Reports he has noticed a decrease in mobility in the left hip and LE. Also reports some radiating symptoms in the posterior hip, lateral thigh, and lateral lower leg. Pt also has referral for pain management in May.   Pain location: B/L knees, L>R, descriptors: sharp and tight  Aggravating factors: extended periods of walking, descending stairs, getting in/out of car  Relieving factors: stretching, exercise, avoiding aggravating positions   24hr pain pattern: 0/10 (current), 0/10 (best), 6-7/10 (worst)   Imaging: X-ray  Previous treatments: injection and previous physical therapy  Occupation/recreation: retired, goes to the gym 3x/wk   Sleeping: no disturbed sleep reported   Patient concerns: decreasing pain, improving mobility, improving function, improving  strength, and remaining active  Special Questions: (-) Red flag screening          Objective     Active Range of Motion   Left Knee   Flexion: 90 degrees   Extension: -5 degrees     Right Knee   Normal active range of motion    Additional Active Range of Motion Details  Pain reported with OP into flexion and extension in the left knee     Mobility   Patellar Mobility:   Left Knee   Hypomobile: left medial, left lateral, left superior and left inferior    Right Knee   Hypomobile: medial, lateral, superior and inferior   Tibiofibular Mobility:   Left knee Hypomobile in the anterior and posterior tibiofibular tendon(s).   Right knee Hypomobile in the anterior and posterior tibiofibular tendon(s).     Patellar Static Positioning   Left Knee: WFL  Right Knee: WFL    Strength/Myotome Testing     Left Hip   Normal muscle strength    Right Hip   Normal muscle strength    Left Knee   Normal strength    Right Knee   Normal strength    Additional Strength Details  Pain reported in the left knee with resisted hip flexion and knee flexion     Ambulation     Ambulation: Stairs   Ascend stairs: independent  Pattern: non-reciprocal  Descend stairs: independent  Pattern: non-reciprocal    Observational Gait   Gait: antalgic   Decreased left stance time and left step length.     Quality of Movement During Gait     Knee    Knee (Left): Positive stiff knee.              Diagnosis: Decreased LE Mobility    Precautions: N/A    POC: 4/24-6/5   Authorization: BOMN    Access Code:  [email or web address]   Visit Count 1 2 3 4 5   Manuals 4/24       L Knee/LE Stretching                         Neuro Re-Ed        Quad set         SLR         Bridge         [facility]                 Education  S/S, POC, HEP       There Ex         Active warm up        LAQ        Hip Abd Iso         Hip Add Iso                                Ther Act             Squat         Step up         Lunge                                     Modalities

## 2024-04-29 ENCOUNTER — ANESTHESIA EVENT (OUTPATIENT)
Dept: GASTROENTEROLOGY | Facility: HOSPITAL | Age: 64
End: 2024-04-29

## 2024-04-29 ENCOUNTER — HOSPITAL ENCOUNTER (OUTPATIENT)
Dept: GASTROENTEROLOGY | Facility: HOSPITAL | Age: 64
Setting detail: OUTPATIENT SURGERY
Discharge: HOME | End: 2024-04-29
Attending: COLON & RECTAL SURGERY
Payer: MEDICARE

## 2024-04-29 ENCOUNTER — ANESTHESIA (OUTPATIENT)
Dept: GASTROENTEROLOGY | Facility: HOSPITAL | Age: 64
End: 2024-04-29

## 2024-04-29 VITALS
RESPIRATION RATE: 16 BRPM | DIASTOLIC BLOOD PRESSURE: 71 MMHG | BODY MASS INDEX: 25.93 KG/M2 | HEIGHT: 74 IN | SYSTOLIC BLOOD PRESSURE: 132 MMHG | WEIGHT: 202 LBS | TEMPERATURE: 97.9 F | OXYGEN SATURATION: 99 % | HEART RATE: 50 BPM

## 2024-04-29 DIAGNOSIS — Z12.11 SCREENING FOR COLON CANCER: ICD-10-CM

## 2024-04-29 PROCEDURE — G0121 COLON CA SCRN NOT HI RSK IND: HCPCS | Performed by: COLON & RECTAL SURGERY

## 2024-04-29 RX ORDER — SODIUM CHLORIDE, SODIUM LACTATE, POTASSIUM CHLORIDE, CALCIUM CHLORIDE 600; 310; 30; 20 MG/100ML; MG/100ML; MG/100ML; MG/100ML
INJECTION, SOLUTION INTRAVENOUS CONTINUOUS PRN
Status: DISCONTINUED | OUTPATIENT
Start: 2024-04-29 | End: 2024-04-29

## 2024-04-29 RX ORDER — PROPOFOL 10 MG/ML
INJECTION, EMULSION INTRAVENOUS AS NEEDED
Status: DISCONTINUED | OUTPATIENT
Start: 2024-04-29 | End: 2024-04-29

## 2024-04-29 RX ORDER — LIDOCAINE HYDROCHLORIDE 10 MG/ML
INJECTION, SOLUTION EPIDURAL; INFILTRATION; INTRACAUDAL; PERINEURAL AS NEEDED
Status: DISCONTINUED | OUTPATIENT
Start: 2024-04-29 | End: 2024-04-29

## 2024-04-29 RX ADMIN — PROPOFOL 50 MG: 10 INJECTION, EMULSION INTRAVENOUS at 11:43

## 2024-04-29 RX ADMIN — SODIUM CHLORIDE, SODIUM LACTATE, POTASSIUM CHLORIDE, AND CALCIUM CHLORIDE: .6; .31; .03; .02 INJECTION, SOLUTION INTRAVENOUS at 11:38

## 2024-04-29 RX ADMIN — PROPOFOL 30 MG: 10 INJECTION, EMULSION INTRAVENOUS at 11:50

## 2024-04-29 RX ADMIN — PROPOFOL 80 MG: 10 INJECTION, EMULSION INTRAVENOUS at 11:45

## 2024-04-29 RX ADMIN — PROPOFOL 50 MG: 10 INJECTION, EMULSION INTRAVENOUS at 11:46

## 2024-04-29 RX ADMIN — PROPOFOL 20 MG: 10 INJECTION, EMULSION INTRAVENOUS at 11:57

## 2024-04-29 RX ADMIN — PROPOFOL 120 MG: 10 INJECTION, EMULSION INTRAVENOUS at 11:40

## 2024-04-29 RX ADMIN — LIDOCAINE HYDROCHLORIDE 50 MG: 10 INJECTION, SOLUTION EPIDURAL; INFILTRATION; INTRACAUDAL at 11:40

## 2024-04-29 NOTE — ANESTHESIA POSTPROCEDURE EVALUATION
Post-Op Assessment Note    CV Status:  Stable  Pain Score: 0    Pain management: adequate       Mental Status:  Sleepy and arousable   Hydration Status:  Euvolemic and stable   PONV Controlled:  Controlled   Airway Patency:  Patent     Post Op Vitals Reviewed: Yes    No anethesia notable event occurred.    Staff: CRNA               BP   106/56   Temp      Pulse  56   Resp   15   SpO2   97%

## 2024-04-29 NOTE — ANESTHESIA PREPROCEDURE EVALUATION
Procedure:  COLONOSCOPY    Relevant Problems   CARDIO   (+) CAD S/P percutaneous coronary angioplasty   (+) Essential (primary) hypertension   (+) Mixed hyperlipidemia      MUSCULOSKELETAL   (+) Primary osteoarthritis of both knees      Endocrine   (+) Impaired fasting glucose      Other   (+) Male erectile disorder      Lab Results   Component Value Date     01/11/2017    SODIUM 140 04/06/2024    K 3.7 04/06/2024     04/06/2024    CO2 28 04/06/2024    AGAP 9 04/06/2024    BUN 20 04/06/2024    CREATININE 0.74 04/06/2024    GLUC 111 (H) 04/06/2024    GLUF 98 11/07/2023    CALCIUM 9.5 04/06/2024    AST 29 04/06/2024    ALT 29 04/06/2024    ALKPHOS 56 04/06/2024    PROT 7.0 01/11/2017    TP 7.4 04/06/2024    BILITOT 0.6 01/11/2017    TBILI 0.5 04/06/2024    EGFR 100 04/06/2024     Lab Results   Component Value Date    WBC 5.86 11/07/2023    HGB 15.3 11/07/2023    HCT 45.2 11/07/2023    MCV 92 11/07/2023     11/07/2023            Anesthesia Plan  ASA Score- 2     Anesthesia Type- IV sedation with anesthesia with ASA Monitors.         Additional Monitors:     Airway Plan:            Plan Factors-Exercise tolerance (METS): >4 METS.    Chart reviewed. EKG reviewed. Imaging results reviewed. Existing labs reviewed. Patient summary reviewed.                  Induction- intravenous.    Postoperative Plan-     Informed Consent-

## 2024-04-29 NOTE — H&P
History and Physical   Colon and Rectal Surgery   Rupesh Doyle 64 y.o. male MRN: [id number]  Unit/Bed#:  Encounter: [id number]  04/29/24   11:28 AM              ASSESSMENT:  Rupesh Doyle is a 64 y.o. male who presents for screening colonoscopy, last 2019.  Screening colonoscopy,discussed in a face-to-face, personal, informed consent process, the benefits, alternatives, risks including not limited to bleeding, missed lesion, perforation requiring emergent surgery discussed/understood, signed consent.    PLAN:  Colonoscopy    No chief complaint on file.        History of Present Illness   HPI:  Rupesh Doyle is a 64 y.o. male who presents for screening colonoscopy.  Denies nausea/vomiting/abdominal pain, change in bowel habits, rectal bleeding, or other constitutional symptoms.       Historical Information   Past Medical History:   Diagnosis Date    CAD (coronary artery disease)     s/p stents    Chronic idiopathic gout 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 tophus 3/18/2015    Hyperlipidemia     Hypertension      Past Surgical History:   Procedure Laterality Date    CORONARY ANGIOPLASTY WITH STENT PLACEMENT      HEMORROIDECTOMY      HERNIA REPAIR      CT COLONOSCOPY FLX DX W/COLLJ SPEC WHEN PFRMD N/A 1/11/2019    Procedure: COLONOSCOPY;  Surgeon: Suzanna Clayton MD;  Location: [facility];  Service: Colorectal       Meds/Allergies     (Not in a hospital admission)        Current Outpatient Medications:     allopurinol (ZYLOPRIM) 300 mg tablet, Take 1 tablet (300 mg total) by mouth daily, Disp: 90 tablet, Rfl: 3    aspirin 81 MG tablet, Take 81 mg by mouth daily, Disp: , Rfl:     methylPREDNISolone 4 MG tablet therapy pack, Use as directed on package, Disp: 21 tablet, Rfl: 0    Multiple Vitamins-Minerals (MENS 50+ MULTI VITAMIN/MIN PO), Take 1 tablet by mouth every morning, Disp: , Rfl:     nitroglycerin (NITROSTAT) 0.4 mg SL tablet, Place 1 tablet (0.4 mg total) under the tongue every 5 (five) minutes as  "needed for chest pain, Disp: 25 tablet, Rfl: 1    simvastatin (ZOCOR) 40 mg tablet, Take 1 tablet (40 mg total) by mouth daily, Disp: 90 tablet, Rfl: 3    tadalafil (CIALIS) 20 MG tablet, Take 1 tablet (20 mg total) by mouth daily as needed for erectile dysfunction, Disp: 10 tablet, Rfl: 3    No Known Allergies      Social History   Social History     Substance and Sexual Activity   Alcohol Use Yes    Comment: 1 per week     Social History     Substance and Sexual Activity   Drug Use No     Social History     Tobacco Use   Smoking Status Former    Current packs/day: 0.00    Average packs/day: 3.0 packs/day for 10.0 years (30.0 ttl pk-yrs)    Types: Cigarettes    Start date: 1979    Quit date: 1989    Years since quittin.3   Smokeless Tobacco Never         Family History:   Family History   Problem Relation Age of Onset    Coronary artery disease Mother          Objective     Current Vitals:   Blood Pressure: 137/75 (24 1016)  Pulse: 64 (24 1016)  Temperature: (!) 97.3 °F (36.3 °C) (24 1016)  Temp Source: Temporal (24 1016)  Respirations: 18 (24 1016)  Height: 6' 2\" (188 cm) (24 1016)  Weight - Scale: 91.6 kg (202 lb) (24 1016)  SpO2: 100 % (24 1016)  No intake or output data in the 24 hours ending 24 1128    Physical Exam:  General:no distress  Eyes:perrla/eomi  ENT:moist mucus membranes  Neck:supple  Pulm:no increased work of breathing  CV:sinus  Abdomen:soft,nontender  Extremities:no edema  "

## 2024-04-30 ENCOUNTER — OFFICE VISIT (OUTPATIENT)
Dept: PHYSICAL THERAPY | Facility: CLINIC | Age: 64
End: 2024-04-30
Payer: MEDICARE

## 2024-04-30 DIAGNOSIS — M25.562 CHRONIC PAIN OF BOTH KNEES: Primary | ICD-10-CM

## 2024-04-30 DIAGNOSIS — G89.29 CHRONIC PAIN OF BOTH KNEES: Primary | ICD-10-CM

## 2024-04-30 DIAGNOSIS — M25.561 CHRONIC PAIN OF BOTH KNEES: Primary | ICD-10-CM

## 2024-04-30 PROCEDURE — 97110 THERAPEUTIC EXERCISES: CPT

## 2024-04-30 PROCEDURE — 97140 MANUAL THERAPY 1/> REGIONS: CPT

## 2024-04-30 NOTE — PROGRESS NOTES
"Daily Note     Today's date: 2024  Patient name: Rupesh Doyle  : 1960  MRN: [id number]  Referring provider: Renan Castro MD  Dx:   Encounter Diagnosis     ICD-10-CM    1. Chronic pain of both knees  M25.561     M25.562     G89.29                      Subjective: Pt states compliance w/ HEP; no new complaints since IE.       Objective: See treatment diary below      Assessment: Tolerated treatment well. Patient would benefit from continued PT.  Increased knee discomfort noted w/ passive knee flexion stretch. No adverse effects noted w/ TE performed.       Plan: Progress treatment as tolerated.       1:1 10:53-11:17am  Diagnosis: Decreased LE Mobility    Precautions: N/A    POC: -   Authorization: TOAN    Access Code:  [email or web address]   Visit Count 1 2 3 4 5   Manuals       L Knee/LE Stretching   LM                      Neuro Re-Ed        Quad set   20x3\" ea      SLR   2x10 ea      Bridge   2x10      Clamshell                 Education  S/S, POC, HEP       There Ex         Active warm up  Bike 5' lv 1      LAQ  2x10 2.5#      Hip Abd Iso   20x3\"      Hip Add Iso   20x3\"                             Ther Act             Squat   NV      Step up         Lunge                                     Modalities                                            "

## 2024-05-02 ENCOUNTER — OFFICE VISIT (OUTPATIENT)
Dept: PHYSICAL THERAPY | Facility: CLINIC | Age: 64
End: 2024-05-02
Payer: MEDICARE

## 2024-05-02 DIAGNOSIS — G89.29 CHRONIC PAIN OF BOTH KNEES: Primary | ICD-10-CM

## 2024-05-02 DIAGNOSIS — M25.561 CHRONIC PAIN OF BOTH KNEES: Primary | ICD-10-CM

## 2024-05-02 DIAGNOSIS — M25.562 CHRONIC PAIN OF BOTH KNEES: Primary | ICD-10-CM

## 2024-05-02 PROCEDURE — 97530 THERAPEUTIC ACTIVITIES: CPT

## 2024-05-02 PROCEDURE — 97140 MANUAL THERAPY 1/> REGIONS: CPT

## 2024-05-02 PROCEDURE — 97110 THERAPEUTIC EXERCISES: CPT

## 2024-05-02 NOTE — PROGRESS NOTES
"Daily Note     Today's date: 2024  Patient name: Rupesh Doyle  : 1960  MRN: [id number]  Referring provider: Renan Castro MD  Dx:   Encounter Diagnosis     ICD-10-CM    1. Chronic pain of both knees  M25.561     M25.562     G89.29                      Subjective: Pt c/o increased L knee discomfort following a 2 mile walk today; pt states the knee began to hurt \"about 200 yards in\".      Objective: See treatment diary below      Assessment: Tolerated treatment well. Patient exhibited good technique with therapeutic exercises and would benefit from continued PT.  Pt c/o difficulty bending the knee initially w/ rec bike; pt also notes discomfort in L lateral knee/lower leg that resolves after ending bike.  No increased knee discomfort noted w/ progression to squats, step ups. Good tolerance to addition of seated tib-fem distraction; increased discomfort w/ passive end range knee flexion.      Plan: Progress treatment as tolerated.         Diagnosis: Decreased LE Mobility    Precautions: N/A    POC: -   Authorization: LUANNMN    Access Code:  [email or web address]   Visit Count 1 2 3 4 5   Manuals  5/2     L Knee/LE Stretching   LM LM     Seated tib-fem distraction   LM             Neuro Re-Ed        Quad set   20x3\" ea 20x3\" ea     SLR   2x10 ea NV     Bridge   2x10 2x10     Clamshell                 Education  S/S, POC, HEP       There Ex         Active warm up  Bike 5' lv 1 Bike 5' lv 1     LAQ  2x10 2.5# 2.5# 2x10 ea     Hip Abd Iso   20x3\" 20x3\"      Hip Add Iso   20x3\" 20x3\"                            Ther Act             Squat   NV 2x10     Step up    6\" x10 ea     Lunge                                     Modalities                                              "

## 2024-05-07 ENCOUNTER — OFFICE VISIT (OUTPATIENT)
Dept: PHYSICAL THERAPY | Facility: CLINIC | Age: 64
End: 2024-05-07
Payer: MEDICARE

## 2024-05-07 DIAGNOSIS — G89.29 CHRONIC PAIN OF BOTH KNEES: Primary | ICD-10-CM

## 2024-05-07 DIAGNOSIS — M25.562 CHRONIC PAIN OF BOTH KNEES: Primary | ICD-10-CM

## 2024-05-07 DIAGNOSIS — M25.561 CHRONIC PAIN OF BOTH KNEES: Primary | ICD-10-CM

## 2024-05-07 PROCEDURE — 97112 NEUROMUSCULAR REEDUCATION: CPT | Performed by: PHYSICAL THERAPIST

## 2024-05-07 PROCEDURE — 97110 THERAPEUTIC EXERCISES: CPT | Performed by: PHYSICAL THERAPIST

## 2024-05-07 PROCEDURE — 97140 MANUAL THERAPY 1/> REGIONS: CPT | Performed by: PHYSICAL THERAPIST

## 2024-05-07 NOTE — PROGRESS NOTES
"Daily Note     Today's date: 2024  Patient name: Rupesh Doyle  : 1960  MRN: [id number]  Referring provider: Renan Castro MD  Dx:   Encounter Diagnosis     ICD-10-CM    1. Chronic pain of both knees  M25.561     M25.562     G89.29           Start Time: 1145  Stop Time: 1230  Total time in clinic (min): 45 minutes    Subjective: Pt reports he went hiking on Saturday and was able to walk a few hundred yards before needing to stop and stretch. Continues to report limitations with knee flexion >90*.       Objective: See treatment diary below      Assessment: Tolerated treatment well. Continued with current POC with good tolerance from the patient. Noted discomfort in the left posterior knee and proximal lateral lower leg. No significant worsening of symptoms noted post tx. Progress as tolerated. Patient would benefit from continued PT      Plan: Continue per plan of care.      Diagnosis: Decreased LE Mobility    Precautions: N/A    POC: -   Authorization: LUANNMN    Access Code:  [email or web address]   Visit Count 1 2 3 4 5   Manuals / 5/    L Knee/LE Stretching   LM LM KCB    Seated tib-fem distraction   LM KCB            Neuro Re-Ed        Quad set   20x3\" ea 20x3\" ea 20x3\"    SLR   2x10 ea NV 2x10 ea     Bridge   2x10 2x10 2x10    Clamshell                 Education  S/S, POC, HEP       There Ex         Active warm up  Bike 5' lv 1 Bike 5' lv 1 Bike L1 5'     LAQ  2x10 2.5# 2.5# 2x10 ea 2x10 ea 2.5#    Hip Abd Iso   20x3\" 20x3\"  20x3\"    Hip Add Iso   20x3\" 20x3\" 20x3\"                            Ther Act             Squat   NV 2x10 2x10     Step up    6\" x10 ea 2x10 ea 6\" step    Lunge                                     Modalities                                                "

## 2024-05-09 ENCOUNTER — OFFICE VISIT (OUTPATIENT)
Dept: PHYSICAL THERAPY | Facility: CLINIC | Age: 64
End: 2024-05-09
Payer: MEDICARE

## 2024-05-09 DIAGNOSIS — M25.562 CHRONIC PAIN OF BOTH KNEES: Primary | ICD-10-CM

## 2024-05-09 DIAGNOSIS — M25.561 CHRONIC PAIN OF BOTH KNEES: Primary | ICD-10-CM

## 2024-05-09 DIAGNOSIS — G89.29 CHRONIC PAIN OF BOTH KNEES: Primary | ICD-10-CM

## 2024-05-09 PROCEDURE — 97140 MANUAL THERAPY 1/> REGIONS: CPT | Performed by: PHYSICAL THERAPIST

## 2024-05-09 PROCEDURE — 97110 THERAPEUTIC EXERCISES: CPT | Performed by: PHYSICAL THERAPIST

## 2024-05-09 NOTE — PROGRESS NOTES
"Daily Note     Today's date: 2024  Patient name: Rupesh Doyle  : 1960  MRN: [id number]  Referring provider: Renan Castro MD  Dx:   Encounter Diagnosis     ICD-10-CM    1. Chronic pain of both knees  M25.561     M25.562     G89.29           Start Time: 1145  Stop Time: 1225  Total time in clinic (min): 40 minutes    Subjective: Pt with no new concerns noted at this time.       Objective: See treatment diary below      Assessment: Tolerated treatment well. Continued with current POC with good tolerance from the patient. No significant pain reported with exercises. Improved knee flexion mobility noted following tib-fem and fibular mobs. Continue to progress as tolerated. Patient would benefit from continued PT      Plan: Continue per plan of care.      Diagnosis: Decreased LE Mobility    Precautions: N/A    POC: -   Authorization: BOMN    Access Code:  [email or web address]   Visit Count 1 2 3 4 5   Manuals     L Knee/LE Stretching   LM LM KCB KCB   Seated tib-fem distraction   LM KCB KCB   S/L Fibular Mobs     KCB           Neuro Re-Ed        Quad set   20x3\" ea 20x3\" ea 20x3\" 20x3\"    SLR   2x10 ea NV 2x10 ea  2x10 ea    Bridge   2x10 2x10 2x10 2x10    Clamshell                 Education  S/S, POC, HEP       There Ex         Active warm up  Bike 5' lv 1 Bike 5' lv 1 Bike L1 5'  Bike L1 5'    LAQ  2x10 2.5# 2.5# 2x10 ea 2x10 ea 2.5# 2x10 ea 3#    Hip Abd Iso   20x3\" 20x3\"  20x3\" 20x3\"   Hip Add Iso   20x3\" 20x3\" 20x3\"  20x3\"    SB Calf Stretch      10x10\"                  Ther Act             Squat   NV 2x10 2x10     Step up    6\" x10 ea 2x10 ea 6\" step    Lunge                                     Modalities                                                  "

## 2024-05-13 ENCOUNTER — TELEPHONE (OUTPATIENT)
Dept: PAIN MEDICINE | Facility: CLINIC | Age: 64
End: 2024-05-13

## 2024-05-13 ENCOUNTER — CONSULT (OUTPATIENT)
Dept: PAIN MEDICINE | Facility: CLINIC | Age: 64
End: 2024-05-13
Payer: MEDICARE

## 2024-05-13 ENCOUNTER — TRANSCRIBE ORDERS (OUTPATIENT)
Dept: PAIN MEDICINE | Facility: CLINIC | Age: 64
End: 2024-05-13

## 2024-05-13 VITALS
SYSTOLIC BLOOD PRESSURE: 143 MMHG | BODY MASS INDEX: 24.9 KG/M2 | HEART RATE: 62 BPM | HEIGHT: 74 IN | WEIGHT: 194 LBS | DIASTOLIC BLOOD PRESSURE: 82 MMHG

## 2024-05-13 DIAGNOSIS — M54.12 CERVICAL RADICULOPATHY: Primary | ICD-10-CM

## 2024-05-13 DIAGNOSIS — M54.16 LEFT LUMBAR RADICULOPATHY: ICD-10-CM

## 2024-05-13 PROCEDURE — 99204 OFFICE O/P NEW MOD 45 MIN: CPT | Performed by: ANESTHESIOLOGY

## 2024-05-13 NOTE — TELEPHONE ENCOUNTER
This patient is being considered for a neuraxial injection for the management of their pain. However, the patient is currently on an anticoagulant per your orders. The American Society of Regional Anest[employer] Guideline on neuraxial procedures and anti-coagulation indicates that medication should be held as follows: Aspirin 6 days prior to injection.   Please advise if you ok the hold of this medication.    Thank you,    Juan Leach  Procedure   [facility]

## 2024-05-13 NOTE — PROGRESS NOTES
Assessment  1. Cervical radiculopathy    2. Left lumbar radiculopathy        Plan  The patient symptoms, history/physical are consistent with pain that is multifactorial in origin.  He has cervical foraminal stenosis which is leading to a left-sided cervical radiculopathy.  He is also experiencing left lumbar radiculopathy of an unclear origin.  At this time, he will be scheduled for cervical epidural injections help reduce swelling/inflammation from the cervical foraminal stenosis and an MRI lumbar spine will be ordered to assess for the etiology of the lumbar radiculopathy.    Complete risks and benefits including bleeding, infection, tissue reaction, nerve injury and allergic reaction were discussed. The approach was demonstrated using models and literature was provided. Verbal and written consent was obtained.    My impressions and treatment recommendations were discussed in detail with the patient who verbalized understanding and had no further questions.  Discharge instructions were provided. I personally saw and examined the patient and I agree with the above discussed plan of care.    Orders Placed This Encounter   Procedures   • MRI lumbar spine without contrast     Standing Status:   Future     Standing Expiration Date:   5/13/2028     Scheduling Instructions:      There is no preparation for this test. Please leave your jewelry and valuables at home, wedding rings are the exception. All patients will be required to change into a hospital gown and pants.  Street clothes are not permitted in the MRI.  Magnetic nail polish must be removed prior to arrival for your test. Please bring your insurance cards, a form of photo ID and a list of your medications with you. Arrive 15 minutes prior to your appointment time in order to register. Please bring any prior CT or MRI studies of this area that were not performed at a [facility].            To schedule this appointment, please contact Central Scheduling  at [phone].            Prior to your appointment, please make sure you complete the MRI Screening Form when you e-Check in for your appointment. This will be available starting 7 days before your appointment in [facility]. You may receive an e-mail with an activation code if you do not have a [employer] account. If you do not have access to a device, we will complete your screening at your appointment.     Order Specific Question:   What is the patient's sedation requirement?     Answer:   No Sedation     Order Specific Question:   Does this procedure require the 3T MRI at [place] or [place]?     Answer:   No     Order Specific Question:   Release to patient through [email or web address]     Answer:   Immediate     Order Specific Question:   Is order priority selected as STAT?     Answer:   No     Order Specific Question:   Reason for Exam     Answer:   lbp into left leg   • FL spine and pain procedure     Standing Status:   Future     Standing Expiration Date:   5/13/2028     Order Specific Question:   Reason for Exam:     Answer:   CASSANDRA     Order Specific Question:   Anticoagulant hold needed?     Answer:   Yes-aspirin     No orders of the defined types were placed in this encounter.      History of Present Illness    Rupesh Doyle is a 64 y.o. male referred by Dr. Fraire for lower back pain as well as neck and rating arm pain.  Symptoms have been present for 5 years.  He denies any precipitating injury or trauma.  Pain is moderate to severe rated 5-6/10 on a numeric rating scale that is felt intermittently described to be dull, aching and sharp.  Symptoms started in the low back and travel down the left leg.  While the neck goes into both arms.  Pain is aggravated standing, bending, sitting, walking, exercise.  Treatment history has included physical therapy and exercise which have been moderately helpful.    I have personally reviewed and/or updated the patient's past medical history, past surgical history, family history,  social history, current medications, allergies, and vital signs today.     Review of Systems   Constitutional:  Negative for fever and unexpected weight change.   HENT:  Negative for trouble swallowing.    Eyes:  Negative for visual disturbance.   Respiratory:  Negative for shortness of breath and wheezing.    Cardiovascular:  Negative for chest pain and palpitations.   Gastrointestinal:  Negative for constipation, diarrhea, nausea and vomiting.   Endocrine: Negative for cold intolerance, heat intolerance and polydipsia.   Genitourinary:  Negative for difficulty urinating and frequency.   Musculoskeletal:  Positive for back pain and neck pain. Negative for arthralgias, gait problem, joint swelling and myalgias.   Skin:  Negative for rash.   Neurological:  Negative for dizziness, seizures, syncope, weakness and headaches.   Hematological:  Does not bruise/bleed easily.   Psychiatric/Behavioral:  Negative for dysphoric mood.    All other systems reviewed and are negative.      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 tophus 3/18/2015   • Hyperlipidemia    • Hypertension        Past Surgical History:   Procedure Laterality Date   • CORONARY ANGIOPLASTY WITH STENT PLACEMENT     • HEMORROIDECTOMY     • HERNIA REPAIR     • HI COLONOSCOPY FLX DX W/COLLJ SPEC WHEN PFRMD N/A 1/11/2019    Procedure: COLONOSCOPY;  Surgeon: Suzanna Clayton MD;  Location: [facility];  Service: Colorectal       Family History   Problem Relation Age of Onset   • Coronary artery disease Mother        Social History     Occupational History   • Not on file  "  Tobacco Use   • Smoking status: Former     Current packs/day: 0.00     Average packs/day: 3.0 packs/day for 10.0 years (30.0 ttl pk-yrs)     Types: Cigarettes     Start date: 1979     Quit date: 1989     Years since quittin.3   • Smokeless tobacco: Never   Vaping Use   • Vaping status: Never Used   Substance and Sexual Activity   • Alcohol use: Yes     Comment: 1 per week   • Drug use: No   • Sexual activity: Yes     Partners: Female     Birth control/protection: None       Current Outpatient Medications on File Prior to Visit   Medication Sig   • allopurinol (ZYLOPRIM) 300 mg tablet Take 1 tablet (300 mg total) by mouth daily   • aspirin 81 MG tablet Take 81 mg by mouth daily   • Multiple Vitamins-Minerals (MENS 50+ MULTI VITAMIN/MIN PO) Take 1 tablet by mouth every morning   • nitroglycerin (NITROSTAT) 0.4 mg SL tablet Place 1 tablet (0.4 mg total) under the tongue every 5 (five) minutes as needed for chest pain   • simvastatin (ZOCOR) 40 mg tablet Take 1 tablet (40 mg total) by mouth daily   • tadalafil (CIALIS) 20 MG tablet Take 1 tablet (20 mg total) by mouth daily as needed for erectile dysfunction   • [DISCONTINUED] methylPREDNISolone 4 MG tablet therapy pack Use as directed on package     No current facility-administered medications on file prior to visit.       No Known Allergies    Physical Exam    /82   Pulse 62   Ht 6' 2\" (1.88 m)   Wt 88 kg (194 lb)   BMI 24.91 kg/m²     Constitutional: normal, well developed, well nourished, alert, in no distress and non-toxic and no overt pain behavior.  Eyes: anicteric  HEENT: grossly intact  Neck: supple, symmetric, trachea midline and no masses   Pulmonary:even and unlabored  Cardiovascular:No edema or pitting edema present  Skin:Normal without rashes or lesions and well hydrated  Psychiatric:Mood and affect appropriate  Neurologic:Cranial Nerves II-XII grossly intact  Musculoskeletal:normal    Cervical Spine Exam  Appearance:  Normal " lordosis  Palpation/Tenderness:  left cervical paraspinal tenderness  left trapezium tenderness  Range of Motion:  Flexion:  Minimally limited  without pain  Extension:  Moderately limited  with pain  Rotation - Left:  Moderately limited  with pain  Rotation - Right:  Minimally limited  with pain  Motor Strength:  Left Arm Flexion  5/5  Left Arm Extension  5/5  Right Arm Flexion  5/5  Right Arm Extension  5/5  Left Wrist Flexion  5/5  Left Wrist Extension  5/5  Left Finger Abduction  5/5  Right Finger Abduction  5/5    Lumbar Spine Exam  Appearance:  Normal lordosis  Palpation/Tenderness:  left lumbar paraspinal tenderness  Range of Motion:  Flexion:  Moderately limited  with pain  Extension:  Moderately limited  with pain  Motor Strength:  Left hip flexion:  5/5  Left hip extension:  5/5  Right hip flexion:  5/5  Right hip extension:  5/5  Left knee flexion:  5/5  Left knee extension:  5/5  Right knee flexion:  5/5  Right knee extension:  5/5  Left foot dorsiflexion:  5/5  Left foot plantar flexion:  5/5  Right foot dorsiflexion:  5/5    Imaging     MRI Cervical Spine (3/23/2021)    History: Left cervical radiculopathy. Neck pain.     Procedure: MRI of the cervical spine     Technique: Sagittal T1 and STIR. Sagittal and axial T2. Axial gradient echo.     Contrast: None.     Comparison: None.     Findings:   Alignment: Normal.     Bone Marrow: No marrow replacing process. Reactive marrow edema associated with   the left C3-4 facet arthropathy.     CSF/Spinal Cord: Normal.     Visualized Posterior Fossa/Foramen Magnum: Normally positioned cerebellar   tonsils.     C2-3: Mild left facet degeneration.     C3-4: Mild disc bulge. Bilateral uncinate hypertrophy. Left facet arthropathy   associated with reactive marrow edema. Severe left and moderate right neural   foraminal stenosis.     C4-5: Mild disc bulge. Bilateral uncinate hypertrophy. Bilateral facet   degeneration. Severe left and moderate right neural foraminal  stenosis.         C5-6: Loss of disc height. Disc osteophyte complex. Bilateral uncinate   hypertrophy. Mild central canal stenosis. Severe bilateral neural foraminal   stenosis.     C6-7: Disc osteophyte complex. Bilateral uncinate hypertrophy. Severe bilateral   neural foraminal stenosis.     C7-T1: Unremarkable.     Visualized Upper Thoracic Spine: Unremarkable.     Paraspinal Soft Tissues: Unremarkable.

## 2024-05-14 ENCOUNTER — OFFICE VISIT (OUTPATIENT)
Dept: PHYSICAL THERAPY | Facility: CLINIC | Age: 64
End: 2024-05-14
Payer: MEDICARE

## 2024-05-14 DIAGNOSIS — G89.29 CHRONIC PAIN OF BOTH KNEES: Primary | ICD-10-CM

## 2024-05-14 DIAGNOSIS — M25.562 CHRONIC PAIN OF BOTH KNEES: Primary | ICD-10-CM

## 2024-05-14 DIAGNOSIS — M25.561 CHRONIC PAIN OF BOTH KNEES: Primary | ICD-10-CM

## 2024-05-14 PROCEDURE — 97110 THERAPEUTIC EXERCISES: CPT

## 2024-05-14 PROCEDURE — 97140 MANUAL THERAPY 1/> REGIONS: CPT

## 2024-05-14 PROCEDURE — 97112 NEUROMUSCULAR REEDUCATION: CPT

## 2024-05-14 NOTE — TELEPHONE ENCOUNTER
Left message for patient to contact the scheduling office at [phone] to schedule their procedure.

## 2024-05-14 NOTE — PROGRESS NOTES
"Daily Note     Today's date: 2024  Patient name: Rupesh Doyle  : 1960  MRN: [id number]  Referring provider: Renan Castro MD  Dx:   Encounter Diagnosis     ICD-10-CM    1. Chronic pain of both knees  M25.561     M25.562     G89.29                      Subjective: Pt reports brief relief of sx following manual techniques; however, pt notes sx soon return. Pt also reports MD ordered an MRI of lumbar spine for next week.      Objective: See treatment diary below      Assessment: Tolerated treatment well. Patient exhibited good technique with therapeutic exercises and would benefit from continued PT.  Good tolerance to TE performed.  Increased discomfort w/ end range knee flexion; no adverse effects w/ seated distraction.        Plan: Progress treatment as tolerated.       Diagnosis: Decreased LE Mobility    Precautions: N/A    POC: -   Authorization: TOAN    Access Code:  [email or web address]   Visit Count 6 2 3 4 5   Manuals     L Knee/LE Stretching  LM LM LM KCB KCB   Seated tib-fem distraction LM  LM KCB KCB   S/L Fibular Mobs nv    KCB           Neuro Re-Ed        Quad set  20x3\" ea 20x3\" ea 20x3\" ea 20x3\" 20x3\"    SLR  2x10 ea 2x10 ea NV 2x10 ea  2x10 ea    Bridge  2x10 2x10 2x10 2x10 2x10    Clamshell                 Education  S/S, POC, HEP       There Ex         Active warm up Bike 5' lv 1 Bike 5' lv 1 Bike 5' lv 1 Bike L1 5'  Bike L1 5'    LAQ 3# 2x10 ea 2x10 2.5# 2.5# 2x10 ea 2x10 ea 2.5# 2x10 ea 3#    Hip Abd Iso  20x3\" 20x3\" 20x3\"  20x3\" 20x3\"   Hip Add Iso  20x3\" 20x3\" 20x3\" 20x3\"  20x3\"    SB Calf Stretch  10x10\"    10x10\"                  Ther Act             Squat   NV 2x10 2x10     Step up    6\" x10 ea 2x10 ea 6\" step    Lunge                                     Modalities                                                    "

## 2024-05-15 ENCOUNTER — TELEPHONE (OUTPATIENT)
Dept: PAIN MEDICINE | Facility: CLINIC | Age: 64
End: 2024-05-15

## 2024-05-16 ENCOUNTER — OFFICE VISIT (OUTPATIENT)
Dept: PHYSICAL THERAPY | Facility: CLINIC | Age: 64
End: 2024-05-16
Payer: MEDICARE

## 2024-05-16 DIAGNOSIS — M25.562 CHRONIC PAIN OF BOTH KNEES: Primary | ICD-10-CM

## 2024-05-16 DIAGNOSIS — G89.29 CHRONIC PAIN OF BOTH KNEES: Primary | ICD-10-CM

## 2024-05-16 DIAGNOSIS — M25.561 CHRONIC PAIN OF BOTH KNEES: Primary | ICD-10-CM

## 2024-05-16 PROCEDURE — 97140 MANUAL THERAPY 1/> REGIONS: CPT | Performed by: PHYSICAL THERAPIST

## 2024-05-16 PROCEDURE — 97110 THERAPEUTIC EXERCISES: CPT | Performed by: PHYSICAL THERAPIST

## 2024-05-16 NOTE — PROGRESS NOTES
"Daily Note     Today's date: 2024  Patient name: Rupesh Doyle  : 1960  MRN: [id number]  Referring provider: Renan Castro MD  Dx:   Encounter Diagnosis     ICD-10-CM    1. Chronic pain of both knees  M25.561     M25.562     G89.29           Start Time: 1145  Stop Time: 1220  Total time in clinic (min): 35 minutes    Subjective: Pt reports he want for a hike at [facility] and had to stop several times to stretch the knee out. Reports by the end of the hike, he had difficulty bending the knee at all.       Objective: See treatment diary below      Assessment: Tolerated treatment well. Continued with current POC with good tolerance from the patient. No significant pain reported with exercises. Continues to lack functional knee flexion ROM of the left knee with some improvement noted with manual stretching and joint mobs. Progress as tolerated. Patient would benefit from continued PT      Plan: Continue per plan of care.      Diagnosis: Decreased LE Mobility    Precautions: N/A    POC: -   Authorization: BOMN    Access Code:  [email or web address]   Visit Count 6 7  4 5   Manuals  5/    L Knee/LE Stretching  LM KCB  KCB KCB   Seated tib-fem distraction LM KCB  KCB KCB   S/L Fibular Mobs nv    KCB           Neuro Re-Ed        Quad set  20x3\" ea 20x3\" ea  20x3\" 20x3\"    SLR  2x10 ea 2x10 ea   2x10 ea  2x10 ea    Bridge  2x10 2x10   2x10 2x10    Clamshell                 Education         There Ex         Active warm up Bike 5' lv 1 Bike L1 5'   Bike L1 5'  Bike L1 5'    LAQ 3# 2x10 ea 2x10 ea 3#  2x10 ea 2.5# 2x10 ea 3#    Hip Abd Iso  20x3\" 20x3\"  20x3\" 20x3\"   Hip Add Iso  20x3\" 20x3\"  20x3\"  20x3\"    SB Calf Stretch  10x10\" 10x10\"   10x10\"                Ther Act           Squat     2x10     Step up     2x10 ea 6\" step    Lunge                                   Modalities                                                      "

## 2024-05-21 ENCOUNTER — OFFICE VISIT (OUTPATIENT)
Dept: PHYSICAL THERAPY | Facility: CLINIC | Age: 64
End: 2024-05-21
Payer: MEDICARE

## 2024-05-21 DIAGNOSIS — M25.562 CHRONIC PAIN OF BOTH KNEES: Primary | ICD-10-CM

## 2024-05-21 DIAGNOSIS — G89.29 CHRONIC PAIN OF BOTH KNEES: Primary | ICD-10-CM

## 2024-05-21 DIAGNOSIS — M25.561 CHRONIC PAIN OF BOTH KNEES: Primary | ICD-10-CM

## 2024-05-21 PROCEDURE — 97140 MANUAL THERAPY 1/> REGIONS: CPT | Performed by: PHYSICAL THERAPIST

## 2024-05-21 PROCEDURE — 97110 THERAPEUTIC EXERCISES: CPT | Performed by: PHYSICAL THERAPIST

## 2024-05-21 NOTE — PROGRESS NOTES
"Daily Note     Today's date: 2024  Patient name: Rupesh Doyle  : 1960  MRN: [id number]  Referring provider: Renan Castro MD  Dx:   Encounter Diagnosis     ICD-10-CM    1. Chronic pain of both knees  M25.561     M25.562     G89.29           Start Time: 1015  Stop Time: 1057  Total time in clinic (min): 42 minutes    Subjective: Pt with no new concerns noted at this time. Continues to report pain in the posterior thigh, lateral knee, and lateral lower leg.       Objective: See treatment diary below      Assessment: Tolerated treatment well. Continued with current POC with good tolerance from the patient. Slightly improved knee flexion mobility noted following tib-fib distraction with prone knee flexion stretch. No significant worsening of symptoms noted post tx. Progress as tolerated. Patient would benefit from continued PT      Plan: Continue per plan of care.      Diagnosis: Decreased LE Mobility    Precautions: N/A    POC: -   Authorization: BOMN    Access Code:  [email or web address]   Visit Count 6 7 8  5   Manuals   5/9    L Knee/LE Stretching  LM KCB KCB  KCB   Seated tib-fem distraction LM KCB Prone w/ knee flex stretch   KCB  KCB   S/L Fibular Mobs nv    KCB           Neuro Re-Ed        Quad set  20x3\" ea 20x3\" ea   20x3\"    SLR  2x10 ea 2x10 ea    2x10 ea    Bridge  2x10 2x10  2x10  2x10    Clamshell         [place] Squat    2x10 at bars w/ Blk TB on LLE              Education         There Ex         Active warm up Bike 5' lv 1 Bike L1 5'  Bike L1 5'   Bike L1 5'    LAQ 3# 2x10 ea 2x10 ea 3# 2x10 ea 3#  2x10 ea 3#    Hip Abd Iso  20x3\" 20x3\" 20x3\"  20x3\"   Hip Add Iso  20x3\" 20x3\" 20x3\"   20x3\"    SB Calf Stretch  10x10\" 10x10\" 10x10\"   10x10\"   HS Stretch at Step    10x5\"      Calf Stretch w/ TKE OP    10x5\"                  Ther Act          Squat         Step up         Lunge                                  Modalities                                                        "

## 2024-05-23 ENCOUNTER — HOSPITAL ENCOUNTER (OUTPATIENT)
Dept: MRI IMAGING | Facility: HOSPITAL | Age: 64
End: 2024-05-23
Payer: MEDICARE

## 2024-05-23 ENCOUNTER — OFFICE VISIT (OUTPATIENT)
Dept: PHYSICAL THERAPY | Facility: CLINIC | Age: 64
End: 2024-05-23
Payer: MEDICARE

## 2024-05-23 ENCOUNTER — HOSPITAL ENCOUNTER (OUTPATIENT)
Dept: RADIOLOGY | Facility: HOSPITAL | Age: 64
End: 2024-05-23
Payer: MEDICARE

## 2024-05-23 DIAGNOSIS — M54.16 LEFT LUMBAR RADICULOPATHY: ICD-10-CM

## 2024-05-23 DIAGNOSIS — M25.562 CHRONIC PAIN OF BOTH KNEES: Primary | ICD-10-CM

## 2024-05-23 DIAGNOSIS — G89.29 CHRONIC PAIN OF BOTH KNEES: Primary | ICD-10-CM

## 2024-05-23 DIAGNOSIS — M25.561 CHRONIC PAIN OF BOTH KNEES: Primary | ICD-10-CM

## 2024-05-23 PROCEDURE — 97110 THERAPEUTIC EXERCISES: CPT

## 2024-05-23 PROCEDURE — 97112 NEUROMUSCULAR REEDUCATION: CPT

## 2024-05-23 PROCEDURE — 97140 MANUAL THERAPY 1/> REGIONS: CPT

## 2024-05-23 PROCEDURE — 72148 MRI LUMBAR SPINE W/O DYE: CPT

## 2024-05-23 NOTE — PROGRESS NOTES
"Daily Note     Today's date: 2024  Patient name: Rupesh Doyle  : 1960  MRN: [id number]  Referring provider: Renan Castro MD  Dx:   Encounter Diagnosis     ICD-10-CM    1. Chronic pain of both knees  M25.561     M25.562     G89.29                      Subjective: Pt states he felt better following new stretch last visit.       Objective: See treatment diary below      Assessment: Tolerated treatment well. Patient exhibited good technique with therapeutic exercises and would benefit from continued PT.  Good tolerance to manual techniques; pt benefits from prone distraction w/ knee flexion stretch.  No adverse effects noted w/ TE performed.       Plan: Progress treatment as tolerated.       1:1 11:45-12:25pm    Diagnosis: Decreased LE Mobility    Precautions: N/A    POC: -   Authorization: TOAN    Access Code:  [email or web address]   Visit Count 6 7 8 9 5   Manuals  5/9    L Knee/LE Stretching  LM KCB KCB LM KCB   Seated tib-fem distraction LM KCB Prone w/ knee flex stretch   KCB LM KCB   S/L Fibular Mobs nv    KCB           Neuro Re-Ed        Quad set  20x3\" ea 20x3\" ea   20x3\"    SLR  2x10 ea 2x10 ea    2x10 ea    Bridge  2x10 2x10  2x10 2x10  2x10    Clamshell         [place] Squat    2x10 at bars w/ Blk TB on LLE  2x10 at bars w/ Blkd TB on LLE            Education         There Ex         Active warm up Bike 5' lv 1 Bike L1 5'  Bike L1 5'  Bike 5' lv 1 Bike L1 5'    LAQ 3# 2x10 ea 2x10 ea 3# 2x10 ea 3# 3# 2x10 ea 2x10 ea 3#    Hip Abd Iso  20x3\" 20x3\" 20x3\" 20x3\" 20x3\"   Hip Add Iso  20x3\" 20x3\" 20x3\"  20x3\" 20x3\"    SB Calf Stretch  10x10\" 10x10\" 10x10\"  10x10\" 10x10\"   HS Stretch at Step    10x5\"  10x5\"    Calf Stretch w/ TKE OP    10x5\"  NV                Ther Act          Squat         Step up         Lunge                                  Modalities                                                          "

## 2024-05-28 ENCOUNTER — OFFICE VISIT (OUTPATIENT)
Dept: PHYSICAL THERAPY | Facility: CLINIC | Age: 64
End: 2024-05-28
Payer: MEDICARE

## 2024-05-28 DIAGNOSIS — G89.29 CHRONIC PAIN OF BOTH KNEES: Primary | ICD-10-CM

## 2024-05-28 DIAGNOSIS — M25.562 CHRONIC PAIN OF BOTH KNEES: Primary | ICD-10-CM

## 2024-05-28 DIAGNOSIS — M25.561 CHRONIC PAIN OF BOTH KNEES: Primary | ICD-10-CM

## 2024-05-28 PROCEDURE — 97112 NEUROMUSCULAR REEDUCATION: CPT

## 2024-05-28 PROCEDURE — 97110 THERAPEUTIC EXERCISES: CPT

## 2024-05-28 PROCEDURE — 97140 MANUAL THERAPY 1/> REGIONS: CPT

## 2024-05-28 NOTE — PROGRESS NOTES
"Daily Note     Today's date: 2024  Patient name: Rupesh Doyle  : 1960  MRN: [id number]  Referring provider: Renan Castro MD  Dx:   Encounter Diagnosis     ICD-10-CM    1. Chronic pain of both knees  M25.561     M25.562     G89.29                      Subjective: Pt reports no new complaints.       Objective: See treatment diary below      Assessment: Tolerated treatment well. Patient exhibited good technique with therapeutic exercises and would benefit from continued PT.  Continues to benefit most from prone knee flexion stretch w/ distraction. Increased L knee discomfort w/ [place] squats.       Plan: Progress treatment as tolerated.         Diagnosis: Decreased LE Mobility    Precautions: N/A    POC: -   Authorization: TOAN    Access Code:  [email or web address]   Visit Count 6 7 8 9 10   Manuals    L Knee/LE Stretching  LM KCB KCB LM LM   Seated tib-fem distraction LM KCB Prone w/ knee flex stretch   KCB LM LM   S/L Fibular Mobs nv               Neuro Re-Ed        Quad set  20x3\" ea 20x3\" ea      SLR  2x10 ea 2x10 ea       Bridge  2x10 2x10  2x10 2x10  2x10    Clamshell         [place] Squat    2x10 at bars w/ Blk TB on LLE  2x10 at bars w/ Blk TB on LLE 2x10 at bars w/ blk TB on LLE           Education         There Ex         Active warm up Bike 5' lv 1 Bike L1 5'  Bike L1 5'  Bike 5' lv 1 Bike L1 5'    LAQ 3# 2x10 ea 2x10 ea 3# 2x10 ea 3# 3# 2x10 ea 2x10 ea 3#    Hip Abd Iso  20x3\" 20x3\" 20x3\" 20x3\" 20x3\"   Hip Add Iso  20x3\" 20x3\" 20x3\"  20x3\" 20x3\"    SB Calf Stretch  10x10\" 10x10\" 10x10\"  10x10\" 10x10\"   HS Stretch at Step    10x5\"  10x5\" 10x5\"   Calf Stretch w/ TKE OP    10x5\"  NV                Ther Act          Squat         Step up         Lunge                                  Modalities                                                            "

## 2024-05-30 ENCOUNTER — OFFICE VISIT (OUTPATIENT)
Dept: PHYSICAL THERAPY | Facility: CLINIC | Age: 64
End: 2024-05-30
Payer: MEDICARE

## 2024-05-30 DIAGNOSIS — G89.29 CHRONIC PAIN OF BOTH KNEES: Primary | ICD-10-CM

## 2024-05-30 DIAGNOSIS — M25.561 CHRONIC PAIN OF BOTH KNEES: Primary | ICD-10-CM

## 2024-05-30 DIAGNOSIS — M25.562 CHRONIC PAIN OF BOTH KNEES: Primary | ICD-10-CM

## 2024-05-30 PROCEDURE — 97112 NEUROMUSCULAR REEDUCATION: CPT

## 2024-05-30 PROCEDURE — 97140 MANUAL THERAPY 1/> REGIONS: CPT

## 2024-05-30 PROCEDURE — 97110 THERAPEUTIC EXERCISES: CPT

## 2024-05-30 NOTE — PROGRESS NOTES
"Daily Note     Today's date: 2024  Patient name: Rupesh Doyle  : 1960  MRN: [id number]  Referring provider: Renan Castro MD  Dx:   Encounter Diagnosis     ICD-10-CM    1. Chronic pain of both knees  M25.561     M25.562     G89.29                      Subjective: Pt reports no significant change in L knee discomfort.     Objective: See treatment diary below      Assessment: Tolerated treatment well. Patient exhibited good technique with therapeutic exercises and would benefit from continued PT.  Good tolerance to manual techniques.  No adverse effects noted w/ TE performed.       Plan: Continue per plan of care. Pt would like to be on hold until he sees the MD next week.     Diagnosis: Decreased LE Mobility    Precautions: N/A    POC: -   Authorization: TOAN    Access Code:  [email or web address]   Visit Count 11 7 8 9 10   Manuals    L Knee/LE Stretching  LM KCB KCB LM LM   Seated tib-fem distraction LM KCB Prone w/ knee flex stretch   KCB LM LM   S/L Fibular Mobs                Neuro Re-Ed        Quad set   20x3\" ea      SLR   2x10 ea       Bridge  2x10 2x10  2x10 2x10  2x10    Clamshell         [place] Squat  2x10 at bars w/ blk TB on LLE  2x10 at bars w/ Blk TB on LLE  2x10 at bars w/ Blk TB on LLE 2x10 at bars w/ blk TB on LLE           Education         There Ex         Active warm up Bike 5' lv 1 Bike L1 5'  Bike L1 5'  Bike 5' lv 1 Bike L1 5'    LAQ 3# 2x10 ea 2x10 ea 3# 2x10 ea 3# 3# 2x10 ea 2x10 ea 3#    Hip Abd Iso  20x3\" 20x3\" 20x3\" 20x3\" 20x3\"   Hip Add Iso  20x3\" 20x3\" 20x3\"  20x3\" 20x3\"    SB Calf Stretch  10x10\" 10x10\" 10x10\"  10x10\" 10x10\"   HS Stretch at Step  10x5\"  10x5\"  10x5\" 10x5\"   Calf Stretch w/ TKE OP    10x5\"  NV                Ther Act          Squat         Step up         Lunge                                  Modalities                                                              "

## 2024-06-04 ENCOUNTER — TELEPHONE (OUTPATIENT)
Dept: RADIOLOGY | Facility: CLINIC | Age: 64
End: 2024-06-04

## 2024-06-04 NOTE — TELEPHONE ENCOUNTER
Please let patient know I reviewed the MRI of the lumbar spine and he has multilevel disc bulging and facet arthritis with stenosis that is worst at L4-5.  Based on symptoms or consultation, would recommend proceeding with bilateral L4 transforaminal epidural steroid injection.  If amenable, please send to scheduling team to schedule

## 2024-06-05 NOTE — TELEPHONE ENCOUNTER
Pt informed of lumbar spine MRI results per task. Pt would like to proceed with the back inj recommended by FQ.  Pt aware our  will call him to set up date for inj.

## 2024-06-06 ENCOUNTER — HOSPITAL ENCOUNTER (OUTPATIENT)
Dept: RADIOLOGY | Facility: CLINIC | Age: 64
Discharge: HOME | End: 2024-06-06
Payer: MEDICARE

## 2024-06-06 VITALS
OXYGEN SATURATION: 99 % | HEART RATE: 55 BPM | TEMPERATURE: 98 F | RESPIRATION RATE: 18 BRPM | DIASTOLIC BLOOD PRESSURE: 75 MMHG | SYSTOLIC BLOOD PRESSURE: 135 MMHG

## 2024-06-06 DIAGNOSIS — M54.12 CERVICAL RADICULOPATHY: ICD-10-CM

## 2024-06-06 PROCEDURE — 62321 NJX INTERLAMINAR CRV/THRC: CPT | Performed by: ANESTHESIOLOGY

## 2024-06-06 RX ORDER — METHYLPREDNISOLONE ACETATE 80 MG/ML
80 INJECTION, SUSPENSION INTRA-ARTICULAR; INTRALESIONAL; INTRAMUSCULAR; PARENTERAL; SOFT TISSUE ONCE
Status: COMPLETED | OUTPATIENT
Start: 2024-06-06 | End: 2024-06-06

## 2024-06-06 RX ADMIN — METHYLPREDNISOLONE ACETATE 80 MG: 80 INJECTION, SUSPENSION INTRA-ARTICULAR; INTRALESIONAL; INTRAMUSCULAR; PARENTERAL; SOFT TISSUE at 13:35

## 2024-06-06 RX ADMIN — IOHEXOL 1 ML: 300 INJECTION, SOLUTION INTRAVENOUS at 13:35

## 2024-06-06 NOTE — H&P
History of Present Illness: The patient is a 64 y.o. male who presents with complaints of neck pain is here today for cervical epidural steroid injection    Past Medical History:   Diagnosis Date    CAD (coronary artery disease)     s/p stents    Chronic idiopathic gout 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 tophus 3/18/2015    Hyperlipidemia     Hypertension        Past Surgical History:   Procedure Laterality Date    CORONARY ANGIOPLASTY WITH STENT PLACEMENT      HEMORROIDECTOMY      HERNIA REPAIR      [place] COLONOSCOPY FLX DX W/COLLJ SPEC WHEN PFRMD N/A 1/11/2019    Procedure: COLONOSCOPY;  Surgeon: Suzanna Clayton MD;  Location: [facility];  Service: Colorectal         Current Outpatient Medications:     allopurinol (ZYLOPRIM) 300 mg tablet, Take 1 tablet (300 mg total) by mouth daily, Disp: 90 tablet, Rfl: 3    aspirin 81 MG tablet, Take 81 mg by mouth daily, Disp: , Rfl:     Multiple Vitamins-Minerals (MENS 50+ MULTI VITAMIN/MIN PO), Take 1 tablet by mouth every morning, Disp: , Rfl:     nitroglycerin (NITROSTAT) 0.4 mg SL tablet, Place 1 tablet (0.4 mg total) under the tongue every 5 (five) minutes as needed for chest pain, Disp: 25 tablet, Rfl: 1    simvastatin (ZOCOR) 40 mg tablet, Take 1 tablet (40 mg total) by mouth daily, Disp: 90 tablet, Rfl: 3    tadalafil (CIALIS) 20 MG tablet, Take 1 tablet (20 mg total) by mouth daily as needed for erectile dysfunction, Disp: 10 tablet, Rfl: 3    Current Facility-Administered Medications:     iohexol (OMNIPAQUE) 300 mg/mL injection 1 mL, 1 mL, Epidural, Once, Sergey Bojorquez MD    methylPREDNISolone acetate (DEPO-MEDROL) injection 80 mg, 80 mg, Epidural, Once, Sergey Bojorquez MD    No Known Allergies    Physical Exam:   Vitals:    06/06/24 1304   BP: 152/80   Pulse: (!) 53   Resp: 18   Temp: 98 °F (36.7 °C)   SpO2: 99%     General: Awake, Alert, Oriented x 3, Mood and affect appropriate  Respiratory: Respirations even and unlabored  Cardiovascular: Peripheral pulses  intact; no edema  Musculoskeletal Exam: Neck pain    ASA Score: 3    Patient/Chart Verification  Patient ID Verified: Verbal  ID Band Applied: No  Consents Confirmed: Procedural, To be obtained in the Pre-Procedure area  H&P( within 30 days) Verified: To be obtained in the Pre-Procedure area  Allergies Reviewed: Yes  Anticoag/NSAID held?: Yes (ASA last dose 5/29, advil / aleve held >4 days per pt, denies other nsaids)  Currently on antibiotics?: No    Assessment:   1. Cervical radiculopathy        Plan: CASSANDRA

## 2024-06-06 NOTE — DISCHARGE INSTR - LAB
Epidural Steroid Injection   WHAT YOU NEED TO KNOW:   An epidural steroid injection (KEIRA) is a procedure to inject steroid medicine into the epidural space. The epidural space is between your spinal cord and vertebrae. Steroids reduce inflammation and fluid buildup in your spine that may be causing pain. You may be given pain medicine along with the steroids.          ACTIVITY  Do not drive or operate machinery today.  No strenuous activity today - bending, lifting, etc.  You may resume normal activites starting tomorrow - start slowly and as tolerated.  You may shower today, but no tub baths or hot tubs.  You may have numbness for several hours from the local anesthetic. Please use caution and common sense, especially with weight-bearing activities.    CARE OF THE INJECTION SITE  If you have soreness or pain, apply ice to the area today (20 minutes on/20 minutes off).  Starting tomorrow, you may use warm, moist heat or ice if needed.  You may have an increase or change in your discomfort for 36-48 hours after your treatment.  Apply ice and continue with any pain medication you have been prescribed.  Notify the [facility] if you have any of the following: redness, drainage, swelling, headache, stiff neck or fever above 100°F.    SPECIAL INSTRUCTIONS  Our office will contact you in approximately 14 days for a progress report.    MEDICATIONS  Continue to take all routine medications.  Our office may have instructed you to hold some medications.    As no general anesthesia was used in today's procedure, you should not experience any side effects related to anesthesia.     If you are diabetic, the steroids used in today's injection may temporarily increase your blood sugar levels after the first few days after your injection. Please keep a close eye on your sugars and alert the doctor who manages your diabetes if your sugars are significantly high from your baseline or you are symptomatic.     If you have a  problem specifically related to your procedure, please call our office at [phone].  Problems not related to your procedure should be directed to your primary care physician.

## 2024-06-20 ENCOUNTER — TELEPHONE (OUTPATIENT)
Dept: RADIOLOGY | Facility: MEDICAL CENTER | Age: 64
End: 2024-06-20

## 2024-06-20 NOTE — TELEPHONE ENCOUNTER
Caller: Patient    Doctor: Reno    Reason for call:    50% improvement and pain level is at a 0 but changes depending what he does. Goyal will go across the shoulder and down the arm to the elbow but stops there.        Call back#: [phone]

## 2024-07-11 ENCOUNTER — HOSPITAL ENCOUNTER (OUTPATIENT)
Dept: RADIOLOGY | Facility: CLINIC | Age: 64
Discharge: HOME | End: 2024-07-11
Admitting: ANESTHESIOLOGY
Payer: MEDICARE

## 2024-07-11 VITALS
RESPIRATION RATE: 17 BRPM | TEMPERATURE: 97.7 F | OXYGEN SATURATION: 98 % | SYSTOLIC BLOOD PRESSURE: 125 MMHG | HEART RATE: 56 BPM | DIASTOLIC BLOOD PRESSURE: 70 MMHG

## 2024-07-11 DIAGNOSIS — M51.16 INTERVERTEBRAL DISC DISORDER WITH RADICULOPATHY OF LUMBAR REGION: ICD-10-CM

## 2024-07-11 PROCEDURE — 64483 NJX AA&/STRD TFRM EPI L/S 1: CPT | Performed by: ANESTHESIOLOGY

## 2024-07-11 RX ORDER — 0.9 % SODIUM CHLORIDE 0.9 %
4 VIAL (ML) INJECTION ONCE
Status: COMPLETED | OUTPATIENT
Start: 2024-07-11 | End: 2024-07-11

## 2024-07-11 RX ORDER — BUPIVACAINE HCL/PF 2.5 MG/ML
2 VIAL (ML) INJECTION ONCE
Status: COMPLETED | OUTPATIENT
Start: 2024-07-11 | End: 2024-07-11

## 2024-07-11 RX ORDER — METHYLPREDNISOLONE ACETATE 80 MG/ML
80 INJECTION, SUSPENSION INTRA-ARTICULAR; INTRALESIONAL; INTRAMUSCULAR; PARENTERAL; SOFT TISSUE ONCE
Status: COMPLETED | OUTPATIENT
Start: 2024-07-11 | End: 2024-07-11

## 2024-07-11 RX ADMIN — Medication 4 ML: at 15:11

## 2024-07-11 RX ADMIN — BUPIVACAINE HYDROCHLORIDE 2 ML: 2.5 INJECTION, SOLUTION EPIDURAL; INFILTRATION; INTRACAUDAL at 15:13

## 2024-07-11 RX ADMIN — METHYLPREDNISOLONE ACETATE 80 MG: 80 INJECTION, SUSPENSION INTRA-ARTICULAR; INTRALESIONAL; INTRAMUSCULAR; PARENTERAL; SOFT TISSUE at 15:13

## 2024-07-11 RX ADMIN — IOHEXOL 1 ML: 300 INJECTION, SOLUTION INTRAVENOUS at 15:13

## 2024-07-11 NOTE — DISCHARGE INSTR - LAB
Epidural Steroid Injection   WHAT YOU NEED TO KNOW:   An epidural steroid injection (KEIRA) is a procedure to inject steroid medicine into the epidural space. The epidural space is between your spinal cord and vertebrae. Steroids reduce inflammation and fluid buildup in your spine that may be causing pain. You may be given pain medicine along with the steroids.          ACTIVITY  Do not drive or operate machinery today.  No strenuous activity today - bending, lifting, etc.  You may resume normal activites starting tomorrow - start slowly and as tolerated.  You may shower today, but no tub baths or hot tubs.  You may have numbness for several hours from the local anesthetic. Please use caution and common sense, especially with weight-bearing activities.    CARE OF THE INJECTION SITE  If you have soreness or pain, apply ice to the area today (20 minutes on/20 minutes off).  Starting tomorrow, you may use warm, moist heat or ice if needed.  You may have an increase or change in your discomfort for 36-48 hours after your treatment.  Apply ice and continue with any pain medication you have been prescribed.  Notify the [facility] if you have any of the following: redness, drainage, swelling, headache, stiff neck or fever above 100°F.    SPECIAL INSTRUCTIONS  Our office will contact you in approximately 14 days for a progress report.    MEDICATIONS  Continue to take all routine medications.  Our office may have instructed you to hold some medications.    As no general anesthesia was used in today's procedure, you should not experience any side effects related to anesthesia.     If you are diabetic, the steroids used in today's injection may temporarily increase your blood sugar levels after the first few days after your injection. Please keep a close eye on your sugars and alert the doctor who manages your diabetes if your sugars are significantly high from your baseline or you are symptomatic.     If you have a  problem specifically related to your procedure, please call our office at [phone].  Problems not related to your procedure should be directed to your primary care physician.

## 2024-07-11 NOTE — H&P
History of Present Illness: The patient is a 64 y.o. male who presents with complaints of lower back and leg pain is here today for bilateral L4 transforaminal epidural steroid injection    Past Medical History:   Diagnosis Date    CAD (coronary artery disease)     s/p stents    Chronic idiopathic gout 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 tophus 3/18/2015    Hyperlipidemia     Hypertension        Past Surgical History:   Procedure Laterality Date    CORONARY ANGIOPLASTY WITH STENT PLACEMENT      HEMORROIDECTOMY      HERNIA REPAIR      [place] COLONOSCOPY FLX DX W/COLLJ SPEC WHEN PFRMD N/A 1/11/2019    Procedure: COLONOSCOPY;  Surgeon: Suzanna Clayton MD;  Location: [facility];  Service: Colorectal         Current Outpatient Medications:     allopurinol (ZYLOPRIM) 300 mg tablet, Take 1 tablet (300 mg total) by mouth daily, Disp: 90 tablet, Rfl: 3    aspirin 81 MG tablet, Take 81 mg by mouth daily, Disp: , Rfl:     Multiple Vitamins-Minerals (MENS 50+ MULTI VITAMIN/MIN PO), Take 1 tablet by mouth every morning, Disp: , Rfl:     nitroglycerin (NITROSTAT) 0.4 mg SL tablet, Place 1 tablet (0.4 mg total) under the tongue every 5 (five) minutes as needed for chest pain, Disp: 25 tablet, Rfl: 1    simvastatin (ZOCOR) 40 mg tablet, Take 1 tablet (40 mg total) by mouth daily, Disp: 90 tablet, Rfl: 3    tadalafil (CIALIS) 20 MG tablet, Take 1 tablet (20 mg total) by mouth daily as needed for erectile dysfunction, Disp: 10 tablet, Rfl: 3    Current Facility-Administered Medications:     bupivacaine (PF) (MARCAINE) 0.25 % injection 2 mL, 2 mL, Epidural, Once, Sergey Bojorquez MD    iohexol (OMNIPAQUE) 300 mg/mL injection 1 mL, 1 mL, Epidural, Once, Sergey Bojorquez MD    lidocaine (PF) (XYLOCAINE-MPF) 2 % injection 4 mL, 4 mL, Infiltration, Once, Sergey Bojorquez MD    methylPREDNISolone acetate (DEPO-MEDROL) injection 80 mg, 80 mg, Epidural, Once, Sergey Bojorquez MD    sodium chloride (PF) 0.9 % injection 4 mL, 4 mL, Infiltration,  Once, Sergey Bojorquez MD    No Known Allergies    Physical Exam:   Vitals:    07/11/24 1454   BP: 116/67   Pulse: 66   Resp: 18   Temp: 97.7 °F (36.5 °C)   SpO2: 98%     General: Awake, Alert, Oriented x 3, Mood and affect appropriate  Respiratory: Respirations even and unlabored  Cardiovascular: Peripheral pulses intact; no edema  Musculoskeletal Exam: Lower back and leg pain    ASA Score: 3    Patient/Chart Verification  Patient ID Verified: Verbal  ID Band Applied: No  Consents Confirmed: Procedural  H&P( within 30 days) Verified: To be obtained in the Pre-Procedure area  Allergies Reviewed: Yes  Anticoag/NSAID held?: NA  Currently on antibiotics?: No    Assessment:   1. Intervertebral disc disorder with radiculopathy of lumbar region        Plan: B/L L4 TFESI

## 2024-07-15 ENCOUNTER — TELEPHONE (OUTPATIENT)
Age: 64
End: 2024-07-15

## 2024-07-15 DIAGNOSIS — M51.16 INTERVERTEBRAL DISC DISORDER WITH RADICULOPATHY OF LUMBAR REGION: Primary | ICD-10-CM

## 2024-07-15 NOTE — TELEPHONE ENCOUNTER
S/w Pt, Pt is S/p B/L L4 transforaminal epidural steroid injection on 07/11. Pt C/o Severe, stabbing pain 9/10 in Lt buttocks, shooting down into LLE. Pt states this pain is new and different from prior to injection. Pain is severe with standing and ambulation, Pt does not have the pain while bending over or siting. Pt reports taking robaxin 500mg QID and Aleve with no relief all weekend. Pt denies numbness in groin/perineum. Pt trial ice and heat therapy. RN advised Pt if pain is severe to be evaluated in the ER. Please advise.

## 2024-07-15 NOTE — TELEPHONE ENCOUNTER
Caller: patient    Doctor: FQ    Reason for call: having sever pain, hard time walking and standing  Pain level 9  No improvement     Call back#:

## 2024-07-16 RX ORDER — GABAPENTIN 100 MG/1
CAPSULE ORAL
Qty: 60 CAPSULE | Refills: 1 | Status: SHIPPED | OUTPATIENT
Start: 2024-07-16

## 2024-07-16 NOTE — TELEPHONE ENCOUNTER
Just have to give the steroid more time to work to bring down the swelling/inflammation from his severe stenosis.  I can prescribe him some gabapentin to take to help reduce nerve irritation if he would like

## 2024-07-16 NOTE — TELEPHONE ENCOUNTER
S/w pt, notes not change in symptoms from previous conversation. Pt states he tried stretching this morning which was helpful at the time, but did not provide further relief following stretching session.

## 2024-07-16 NOTE — TELEPHONE ENCOUNTER
Caller: cody    Doctor: jose luis    Reason for call: pt is calling back    Call back#: [phone]

## 2024-07-16 NOTE — TELEPHONE ENCOUNTER
S/w pt, advised of the same. Verbalized understanding and agreeable to plan. Please send gabapentin to [facility] on [address]

## 2024-07-25 ENCOUNTER — TELEPHONE (OUTPATIENT)
Dept: RADIOLOGY | Facility: MEDICAL CENTER | Age: 64
End: 2024-07-25

## 2024-08-08 ENCOUNTER — APPOINTMENT (OUTPATIENT)
Dept: LAB | Facility: CLINIC | Age: 64
End: 2024-08-08

## 2024-08-08 DIAGNOSIS — Z00.6 ENCOUNTER FOR EXAMINATION FOR NORMAL COMPARISON OR CONTROL IN CLINICAL RESEARCH PROGRAM: ICD-10-CM

## 2024-08-08 PROCEDURE — 36415 COLL VENOUS BLD VENIPUNCTURE: CPT

## 2024-08-24 DIAGNOSIS — E79.0 HYPERURICEMIA: ICD-10-CM

## 2024-08-24 DIAGNOSIS — E78.2 MIXED HYPERLIPIDEMIA: ICD-10-CM

## 2024-08-24 RX ORDER — SIMVASTATIN 40 MG
40 TABLET ORAL DAILY
Qty: 90 TABLET | Refills: 3 | Status: SHIPPED | OUTPATIENT
Start: 2024-08-24

## 2024-08-24 RX ORDER — ALLOPURINOL 300 MG/1
300 TABLET ORAL DAILY
Qty: 90 TABLET | Refills: 3 | Status: SHIPPED | OUTPATIENT
Start: 2024-08-24

## 2024-08-31 LAB
APOB+LDLR+PCSK9 GENE MUT ANL BLD/T: NOT DETECTED
BRCA1+BRCA2 DEL+DUP + FULL MUT ANL BLD/T: NOT DETECTED
MLH1+MSH2+MSH6+PMS2 GN DEL+DUP+FUL M: NOT DETECTED

## 2024-09-09 ENCOUNTER — TELEPHONE (OUTPATIENT)
Dept: FAMILY MEDICINE CLINIC | Facility: CLINIC | Age: 64
End: 2024-09-09

## 2024-09-09 NOTE — TELEPHONE ENCOUNTER
Faxed back [facility] form Fax# [phone]. Also scanned into [employer] 9/9/24.

## 2024-09-16 ENCOUNTER — OFFICE VISIT (OUTPATIENT)
Dept: PAIN MEDICINE | Facility: CLINIC | Age: 64
End: 2024-09-16
Payer: MEDICARE

## 2024-09-16 VITALS
HEART RATE: 66 BPM | HEIGHT: 74 IN | RESPIRATION RATE: 18 BRPM | SYSTOLIC BLOOD PRESSURE: 148 MMHG | WEIGHT: 219 LBS | BODY MASS INDEX: 28.11 KG/M2 | DIASTOLIC BLOOD PRESSURE: 82 MMHG

## 2024-09-16 DIAGNOSIS — M51.16 INTERVERTEBRAL DISC DISORDER WITH RADICULOPATHY OF LUMBAR REGION: ICD-10-CM

## 2024-09-16 DIAGNOSIS — M48.062 LUMBAR STENOSIS WITH NEUROGENIC CLAUDICATION: Primary | ICD-10-CM

## 2024-09-16 PROCEDURE — 99214 OFFICE O/P EST MOD 30 MIN: CPT | Performed by: ANESTHESIOLOGY

## 2024-09-16 RX ORDER — GABAPENTIN 300 MG/1
300 CAPSULE ORAL 2 TIMES DAILY
Qty: 60 CAPSULE | Refills: 2 | Status: SHIPPED | OUTPATIENT
Start: 2024-09-16

## 2024-09-16 NOTE — PROGRESS NOTES
Assessment:  1. Lumbar stenosis with neurogenic claudication    2. Intervertebral disc disorder with radiculopathy of lumbar region        Plan:  Unfortunate, the patient had no improvement following the lumbar as well as cervical epidural injection.  At this time, given the degree of stenosis he has in his lumbar spine, I have placed referral for him to see Dr. Fernandez at [facility] to discuss surgical treatment options.    For now, I will increase his gabapentin to 300 mg twice daily for better efficacy.    My impressions and treatment recommendations were discussed in detail with the patient who verbalized understanding and had no further questions.  Discharge instructions were provided. I personally saw and examined the patient and I agree with the above discussed plan of care.    Orders Placed This Encounter   Procedures    Ambulatory referral to Orthopedic Surgery     Standing Status:   Future     Standing Expiration Date:   9/16/2025     Referral Priority:   Routine     Referral Type:   Consult - AMB     Referral Reason:   Specialty Services Required     Referred to Provider:   Jalil Fernandez MD     Requested Specialty:   Orthopedic Surgery     Number of Visits Requested:   1     Expiration Date:   9/16/2025     New Medications Ordered This Visit   Medications    gabapentin (NEURONTIN) 300 mg capsule     Sig: Take 1 capsule (300 mg total) by mouth 2 (two) times a day     Dispense:  60 capsule     Refill:  2       History of Present Illness:  Rupesh Doyle is a 64 y.o. male who who presents for a follow up office visit in regards to Neck Pain and Back Pain.   The patient has a history of cervical stenosis and lumbar stenosis who returns for follow-up.  He was most recently status bilateral L4 transforaminal epidural injection on 7/11/2024 which provided no relief.  He is reporting moderate to severe pain in the left low back radiating into the hip and down the leg rated at times 10/10  on a numeric rating scale.  He is walking with a stooped posture with difficulty walking.  He did see pain management nurse practitioner at [facility] and is scheduled for another epidural steroid injection next month.    He has been taking gabapentin 100 mg with minimal relief.    I have personally reviewed and/or updated the patient's past medical history, past surgical history, family history, social history, current medications, allergies, and vital signs today.     Review of Systems   Respiratory:  Negative for shortness of breath.    Cardiovascular:  Negative for chest pain.   Gastrointestinal:  Negative for constipation, diarrhea, nausea and vomiting.   Musculoskeletal:  Positive for back pain, gait problem, joint swelling, neck pain and neck stiffness. Negative for arthralgias and myalgias.        LLE Pain   Skin:  Negative for rash.   Neurological:  Negative for dizziness, seizures and weakness.   All other systems reviewed and are negative.      Patient Active Problem List   Diagnosis    CAD S/P percutaneous coronary angioplasty    Essential (primary) hypertension    Mixed hyperlipidemia    Vitamin D deficiency    Male erectile disorder    Impaired fasting glucose    Primary osteoarthritis of both knees    Cervical radiculopathy    Intervertebral disc disorder with radiculopathy of lumbar region       Past Medical History:   Diagnosis Date    CAD (coronary artery disease)     s/p stents    Chronic idiopathic gout 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 tophus 3/18/2015    Hyperlipidemia     Hypertension        Past Surgical History:   Procedure Laterality Date    CORONARY ANGIOPLASTY WITH STENT PLACEMENT      HEMORROIDECTOMY      HERNIA REPAIR      MI COLONOSCOPY FLX DX W/COLLJ SPEC WHEN PFRMD N/A 1/11/2019    Procedure: COLONOSCOPY;  Surgeon: Suzanna Clayton MD;  Location: [facility];  Service: Colorectal       Family History   Problem Relation Age of Onset    Coronary artery disease Mother   "      Social History     Occupational History    Not on file   Tobacco Use    Smoking status: Former     Current packs/day: 0.00     Average packs/day: 3.0 packs/day for 10.0 years (30.0 ttl pk-yrs)     Types: Cigarettes     Start date: 1979     Quit date: 1989     Years since quittin.7    Smokeless tobacco: Never   Vaping Use    Vaping status: Never Used   Substance and Sexual Activity    Alcohol use: Yes     Comment: 1 per week    Drug use: No    Sexual activity: Yes     Partners: Female     Birth control/protection: None       Current Outpatient Medications on File Prior to Visit   Medication Sig    allopurinol (ZYLOPRIM) 300 mg tablet TAKE 1 TABLET BY MOUTH EVERY DAY    aspirin 81 MG tablet Take 81 mg by mouth daily    Multiple Vitamins-Minerals (MENS 50+ MULTI VITAMIN/MIN PO) Take 1 tablet by mouth every morning    nitroglycerin (NITROSTAT) 0.4 mg SL tablet Place 1 tablet (0.4 mg total) under the tongue every 5 (five) minutes as needed for chest pain    simvastatin (ZOCOR) 40 mg tablet TAKE 1 TABLET BY MOUTH EVERY DAY    tadalafil (CIALIS) 20 MG tablet Take 1 tablet (20 mg total) by mouth daily as needed for erectile dysfunction    [DISCONTINUED] gabapentin (NEURONTIN) 100 mg capsule Take 1 tablet at bedtime.  May increase to 2 tablets after 3 days     No current facility-administered medications on file prior to visit.       No Known Allergies    Physical Exam:    /82   Pulse 66   Resp 18   Ht 6' 2\" (1.88 m)   Wt 99.3 kg (219 lb)   BMI 28.12 kg/m²     Constitutional:normal, well developed, well nourished, alert, in no distress and non-toxic and no overt pain behavior.  Eyes:anicteric  HEENT:grossly intact  Neck:supple, symmetric, trachea midline and no masses   Pulmonary:even and unlabored  Cardiovascular:No edema or pitting edema present  Skin:Normal without rashes or lesions and well hydrated  Psychiatric:Mood and affect appropriate  Neurologic:Cranial Nerves II-XII grossly " intact  Musculoskeletal:stooped posture    Lumbar Spine Exam  Appearance:  Kyphosis  Palpation/Tenderness:  left lumbar paraspinal tenderness  Range of Motion:  Flexion:  Minimally limited  without pain  Extension:  Severely limited  with pain  Motor Strength:  Left hip flexion:  5/5  Left hip extension:  5/5  Right hip flexion:  5/5  Right hip extension:  5/5  Left knee flexion:  5/5  Left knee extension:  5/5  Right knee flexion:  5/5  Right knee extension:  5/5  Left foot dorsiflexion:  5/5  Left foot plantar flexion:  5/5  Right foot dorsiflexion:  5/5  Right foot plantar flexion:  5/5    Imaging    MRI LUMBAR SPINE WITHOUT CONTRAST (5/23/2024)     INDICATION: M54.16: Radiculopathy, lumbar region.   Low back pain into the left leg.     COMPARISON:  None.     TECHNIQUE:  Multiplanar, multisequence imaging of the lumbar spine was performed. .        IMAGE QUALITY:  Diagnostic     FINDINGS:     VERTEBRAL BODIES:  There are 5 lumbar type vertebral bodies. Minimal anterolisthesis L4-5 and minimal retrolisthesis L1-2. Normal marrow signal is identified within the visualized bony structures.  No discrete marrow lesion.     SACRUM:  Normal signal within the sacrum. No evidence of insufficiency or stress fracture.     DISTAL CORD AND CONUS:  Normal size and signal within the distal cord and conus.     PARASPINAL SOFT TISSUES:  Paraspinal soft tissues are unremarkable.     LOWER THORACIC DISC SPACES:  Normal disc height and signal.  No disc herniation, canal stenosis or foraminal narrowing.     LUMBAR DISC SPACES:     L1-L2: Moderate facet hypertrophy with mild annular bulge and slight retrolisthesis. Mild central stenosis. Foramina patent.     L2-L3: Moderate facet hypertrophy with ligamentous infolding with mild annular bulge and small central protrusion type disc herniation with annular fissure results in mild central stenosis. Foramina patent.     L3-L4: Moderate facet hypertrophy with ligamentous infolding. Mild  annular bulge. Mild central stenosis without foraminal narrowing.     L4-L5: Severe facet hypertrophy with ligamentous infolding accounts for anterolisthesis with uncovering the posterior disc margin. Moderate to severe central stenosis with moderate left and mild right foraminal narrowing.     L5-S1: Severe facet hypertrophy. Minimal annular bulge. No significant central or foraminal narrowing.     OTHER FINDINGS:  None.

## 2024-09-17 ENCOUNTER — TELEPHONE (OUTPATIENT)
Age: 64
End: 2024-09-17

## 2024-09-17 NOTE — TELEPHONE ENCOUNTER
Caller: Rupesh     Doctor: [place]     Reason for call: Patient calling asking if Ortho referral can be faxed to [phone] please advise     Call back#: [phone]

## 2024-10-02 ENCOUNTER — TELEPHONE (OUTPATIENT)
Age: 64
End: 2024-10-02

## 2024-10-02 NOTE — TELEPHONE ENCOUNTER
Caller: cody    Doctor: jose luis    Reason for call: pt is doing fine on the gabapentin, no side effects and he says it is helping.    Call back#: [phone]   EMT / Paramedic

## 2024-11-06 ENCOUNTER — OFFICE VISIT (OUTPATIENT)
Dept: FAMILY MEDICINE CLINIC | Facility: CLINIC | Age: 64
End: 2024-11-06
Payer: MEDICARE

## 2024-11-06 VITALS
TEMPERATURE: 97.8 F | SYSTOLIC BLOOD PRESSURE: 146 MMHG | WEIGHT: 220 LBS | OXYGEN SATURATION: 98 % | DIASTOLIC BLOOD PRESSURE: 82 MMHG | BODY MASS INDEX: 28.23 KG/M2 | HEIGHT: 74 IN | RESPIRATION RATE: 18 BRPM | HEART RATE: 18 BPM

## 2024-11-06 DIAGNOSIS — E79.0 HYPERURICEMIA: ICD-10-CM

## 2024-11-06 DIAGNOSIS — Z98.61 CAD S/P PERCUTANEOUS CORONARY ANGIOPLASTY: ICD-10-CM

## 2024-11-06 DIAGNOSIS — I25.10 CAD S/P PERCUTANEOUS CORONARY ANGIOPLASTY: ICD-10-CM

## 2024-11-06 DIAGNOSIS — I10 ESSENTIAL (PRIMARY) HYPERTENSION: Primary | ICD-10-CM

## 2024-11-06 DIAGNOSIS — Z12.5 SCREENING FOR PROSTATE CANCER: ICD-10-CM

## 2024-11-06 DIAGNOSIS — E78.2 MIXED HYPERLIPIDEMIA: ICD-10-CM

## 2024-11-06 DIAGNOSIS — Z00.00 MEDICARE ANNUAL WELLNESS VISIT, INITIAL: ICD-10-CM

## 2024-11-06 DIAGNOSIS — N52.9 MALE ERECTILE DISORDER: ICD-10-CM

## 2024-11-06 DIAGNOSIS — K80.20 CALCULUS OF GALLBLADDER WITHOUT CHOLECYSTITIS WITHOUT OBSTRUCTION: ICD-10-CM

## 2024-11-06 DIAGNOSIS — R73.03 PREDIABETES: ICD-10-CM

## 2024-11-06 PROCEDURE — G0438 PPPS, INITIAL VISIT: HCPCS | Performed by: FAMILY MEDICINE

## 2024-11-06 PROCEDURE — 99214 OFFICE O/P EST MOD 30 MIN: CPT | Performed by: FAMILY MEDICINE

## 2024-11-06 RX ORDER — TADALAFIL 20 MG/1
20 TABLET ORAL DAILY PRN
Qty: 10 TABLET | Refills: 3 | Status: SHIPPED | OUTPATIENT
Start: 2024-11-06

## 2024-11-06 NOTE — ASSESSMENT & PLAN NOTE
04/2024 ER visit for biliary colic. Ultrasound cholelithiasis gallbladder wall thickening with pericholecystic fluid.  Patient was seen and evaluated by general surgery.  He has remained asymptomatic.Lipase normal at 37.  LFTs normal.    Watch diet avoiding greasy/fried foods

## 2024-11-06 NOTE — ASSESSMENT & PLAN NOTE
Status post stent 11/2008.      10/2020 stress echocardiogram normal no evidence of ischemia.  Ejection fraction 60-65%.  Mild mitral regurgitation.  Trace aortic insufficiency.

## 2024-11-06 NOTE — ASSESSMENT & PLAN NOTE
Lab Results   Component Value Date    HGBA1C 5.7 (H) 11/07/2023         Orders:    Hemoglobin A1C

## 2024-11-06 NOTE — PROGRESS NOTES
Ambulatory Visit  Name: Rupesh Doyle      : 1960      MRN: [id number]  Encounter Provider: Rupesh Otto MD  Encounter Date: 2024   Encounter department: [facility]    Assessment & Plan  Essential (primary) hypertension    No longer on Lisinopril. 26 lb weight gain from 2024. Less active due to back issues       BP Readings from Last 3 Encounters:   24 146/82   24 148/82   24 125/70         Lab Results   Component Value Date     2017    SODIUM 140 2024    K 3.7 2024     2024    CO2 28 2024    AGAP 9 2024    BUN 20 2024    CREATININE 0.74 2024    GLUC 111 (H) 2024    GLUF 98 2023    CALCIUM 9.5 2024    AST 29 2024    ALT 29 2024    ALKPHOS 56 2024    PROT 7.0 2017    TP 7.4 2024    BILITOT 0.6 2017    TBILI 0.5 2024    EGFR 100 2024     Lab Results   Component Value Date    WBC 5.86 2023    HGB 15.3 2023    HCT 45.2 2023    MCV 92 2023     2023     Diet, weight loss and exercise.  Monitor BP    Orders:    Comprehensive metabolic panel    CBC and differential    Mixed hyperlipidemia    On Simvastatin 40 mg daily     Lab Results   Component Value Date    CHOLESTEROL 134 2023    CHOLESTEROL 155 11/15/2022    CHOLESTEROL 153 2021     Lab Results   Component Value Date    HDL 49 2023    HDL 37 (L) 11/15/2022    HDL 41 2021     Lab Results   Component Value Date    TRIG 93 2023    TRIG 218 (H) 11/15/2022    TRIG 161 (H) 2021     Lab Results   Component Value Date    LDLCALC 66 2023          Orders:    Lipid panel    CAD S/P percutaneous coronary angioplasty    Status post stent 2008.      10/2020 stress echocardiogram normal no evidence of ischemia.  Ejection fraction 60-65%.  Mild mitral regurgitation.  Trace aortic insufficiency.         Prediabetes    Lab  Results   Component Value Date    HGBA1C 5.7 (H) 11/07/2023         Orders:    Hemoglobin A1C    Calculus of gallbladder without cholecystitis without obstruction    04/2024 ER visit for biliary colic. Ultrasound cholelithiasis gallbladder wall thickening with pericholecystic fluid.  Patient was seen and evaluated by general surgery.  He has remained asymptomatic.Lipase normal at 37.  LFTs normal.    Watch diet avoiding greasy/fried foods       Hyperuricemia    On Allopurinol 300 mg daily     Lab Results   Component Value Date    URICACID 5.0 11/15/2022           Orders:    Uric acid; Future    Male erectile disorder    Orders:    tadalafil (CIALIS) 20 MG tablet; Take 1 tablet (20 mg total) by mouth daily as needed for erectile dysfunction    Medicare annual wellness visit, initial         Screening for prostate cancer    Orders:    PSA, Total Screen; Future      Depression Screening and Follow-up Plan: Patient was screened for depression during today's encounter. They screened negative with a PHQ-2 score of 0.      Preventive health issues were discussed with patient, and age appropriate screening tests were ordered as noted in patient's After Visit Summary. Personalized health advice and appropriate referrals for health education or preventive services given if needed, as noted in patient's After Visit Summary.    Repeat labs  Patient offered flu vaccine he declined     History of Present Illness         Follow up OV for chronic medical problems/AWV          Patient Care Team:  Rupesh Otto MD as PCP - General  Suzanna Clayton MD as Endoscopist    Review of Systems   Constitutional:  Positive for unexpected weight change. Negative for appetite change, chills and fever.   HENT:  Negative for congestion, ear pain, rhinorrhea, sore throat and trouble swallowing.    Eyes:  Negative for visual disturbance.   Respiratory:  Negative for cough, shortness of breath and wheezing.    Cardiovascular:  Negative for  chest pain, palpitations and leg swelling.   Gastrointestinal:  Negative for abdominal pain, blood in stool, constipation, diarrhea, nausea and vomiting.        04/2024 colonoscopy   Endocrine: Negative for polydipsia and polyuria.   Genitourinary:  Negative for difficulty urinating.        + ED  using PRN Cialis 20 mg     Lab Results       Component                Value               Date                       PSA                      0.58                11/07/2023                 PSA                      0.7                 11/15/2022                 PSA                      0.5                 11/09/2021                     Musculoskeletal:  Positive for back pain and neck pain. Negative for arthralgias and myalgias.        Cervical/lumbar facet arthritis-cervical and lumbar radiculopathy. Pain Management evaluation and PT. Lumbar and cervical KEIRA this year  On Gabapentin 300 mg BID with improvement. Cervical and lumbar MRIs reviewed     Orthopedic spine surgery evaluation 09/2024     OA knees followed by Ortho. He has had steroid injections, visco supplementation and PT.            Skin:  Negative for rash.   Allergic/Immunologic: Negative for environmental allergies.   Neurological:  Negative for dizziness and headaches.   Hematological:  Negative for adenopathy. Does not bruise/bleed easily.   Psychiatric/Behavioral:  Negative for dysphoric mood and sleep disturbance. The patient is not nervous/anxious.        Current Outpatient Medications:     allopurinol (ZYLOPRIM) 300 mg tablet, TAKE 1 TABLET BY MOUTH EVERY DAY, Disp: 90 tablet, Rfl: 3    aspirin 81 MG tablet, Take 81 mg by mouth daily, Disp: , Rfl:     gabapentin (NEURONTIN) 300 mg capsule, Take 1 capsule (300 mg total) by mouth 2 (two) times a day, Disp: 60 capsule, Rfl: 2    Multiple Vitamins-Minerals (MENS 50+ MULTI VITAMIN/MIN PO), Take 1 tablet by mouth every morning, Disp: , Rfl:     nitroglycerin (NITROSTAT) 0.4 mg SL tablet, Place 1 tablet (0.4 mg  total) under the tongue every 5 (five) minutes as needed for chest pain, Disp: 25 tablet, Rfl: 1    simvastatin (ZOCOR) 40 mg tablet, TAKE 1 TABLET BY MOUTH EVERY DAY, Disp: 90 tablet, Rfl: 3    tadalafil (CIALIS) 20 MG tablet, Take 1 tablet (20 mg total) by mouth daily as needed for erectile dysfunction, Disp: 10 tablet, Rfl: 3     Family History   Problem Relation Age of Onset    Coronary artery disease Mother         Medical History Reviewed by provider this encounter:  Tobacco  Allergies  Meds  Problems  Med Hx  Surg Hx  Fam Hx       Annual Wellness Visit Questionnaire   Rupesh is here for his Initial Wellness visit. Last Medicare Wellness visit information reviewed, patient interviewed and updates made to the record today.      Health Risk Assessment:   Patient rates overall health as good. Patient feels that their physical health rating is slightly worse. Patient is satisfied with their life. Eyesight was rated as same. Hearing was rated as same. Patient feels that their emotional and mental health rating is same. Patients states they are never, rarely angry. Patient states they are sometimes unusually tired/fatigued. Pain experienced in the last 7 days has been some. Patient's pain rating has been 4/10. Patient states that he has experienced weight loss or gain in last 6 months.     Depression Screening:   PHQ-2 Score: 0      Fall Risk Screening:   In the past year, patient has experienced: no history of falling in past year      Home Safety:  Patient does not have trouble with stairs inside or outside of their home. Patient has working smoke alarms and has working carbon monoxide detector. Home safety hazards include: none.     Nutrition:   Current diet is Regular.     Medications:   Patient is currently taking over-the-counter supplements. OTC medications include: see medication list. Patient is able to manage medications.     Activities of Daily Living (ADLs)/Instrumental Activities of Daily Living  (IADLs):   Walk and transfer into and out of bed and chair?: Yes  Dress and groom yourself?: Yes    Bathe or shower yourself?: Yes    Feed yourself? Yes  Do your laundry/housekeeping?: Yes  Manage your money, pay your bills and track your expenses?: Yes  Make your own meals?: Yes    Do your own shopping?: Yes    Previous Hospitalizations:   Any hospitalizations or ED visits within the last 12 months?: Yes    How many hospitalizations have you had in the last year?: 1-2    Advance Care Planning:   Living will: No    Durable POA for healthcare: No    Advanced directive: No      Cognitive Screening:   Provider or family/friend/caregiver concerned regarding cognition?: No    PREVENTIVE SCREENINGS      Cardiovascular Screening:    General: History Lipid Disorder    Due for: Lipid Panel      Diabetes Screening:       Due for: Blood Glucose      Colorectal Cancer Screening:     General: Screening Current      Prostate Cancer Screening:      Due for: PSA      Osteoporosis Screening:    General: Screening Not Indicated      Abdominal Aortic Aneurysm (AAA) Screening:    Risk factors include: age between 65-76 yo and tobacco use        General: Screening Current      Lung Cancer Screening:     General: Screening Not Indicated      Hepatitis C Screening:    General: Screening Current    Screening, Brief Intervention, and Referral to Treatment (SBIRT)    Screening  Typical number of drinks in a day: 0  Typical number of drinks in a week: 1  Interpretation: Low risk drinking behavior.    AUDIT-C Screenin) How often did you have a drink containing alcohol in the past year? 2 to 4 times a month  2) How many drinks did you have on a typical day when you were drinking in the past year? 1 to 2  3) How often did you have 6 or more drinks on one occasion in the past year? never    AUDIT-C Score: 2  Interpretation: Score 0-3 (male): Negative screen for alcohol misuse    Single Item Drug Screening:  How often have you used an illegal  "drug (including marijuana) or a prescription medication for non-medical reasons in the past year? never    Single Item Drug Screen Score: 0  Interpretation: Negative screen for possible drug use disorder    Brief Intervention  Alcohol & drug use screenings were reviewed. No concerns regarding substance use disorder identified.     Other Counseling Topics:   Calcium and vitamin D intake and regular weightbearing exercise.     Social Determinants of Health     Financial Resource Strain: Patient Declined (10/27/2023)    Overall Financial Resource Strain (CARDIA)     Difficulty of Paying Living Expenses: Patient declined   Food Insecurity: No Food Insecurity (10/31/2024)    Hunger Vital Sign     Worried About Running Out of Food in the Last Year: Never true     Ran Out of Food in the Last Year: Never true   Transportation Needs: No Transportation Needs (10/31/2024)    PRAPARE - Transportation     Lack of Transportation (Medical): No     Lack of Transportation (Non-Medical): No   Housing Stability: Low Risk  (10/31/2024)    Housing Stability Vital Sign     Unable to Pay for Housing in the Last Year: No     Number of Times Moved in the Last Year: 0     Homeless in the Last Year: No   Utilities: Not At Risk (10/31/2024)    [facility] Utilities     Threatened with loss of utilities: No     No results found.    Objective     /82 (BP Location: Left arm, Patient Position: Sitting, Cuff Size: Large)   Pulse (!) 18   Temp 97.8 °F (36.6 °C)   Resp 18   Ht 6' 2\" (1.88 m)   Wt 99.8 kg (220 lb)   SpO2 98%   BMI 28.25 kg/m²     Wt Readings from Last 3 Encounters:   11/06/24 99.8 kg (220 lb)   09/16/24 99.3 kg (219 lb)   05/13/24 88 kg (194 lb)        Physical Exam  Constitutional:       General: He is not in acute distress.  HENT:      Right Ear: Tympanic membrane and ear canal normal.      Left Ear: Tympanic membrane and ear canal normal.   Eyes:      General: No scleral icterus.     Extraocular Movements: Extraocular " movements intact.      Conjunctiva/sclera: Conjunctivae normal.      Pupils: Pupils are equal, round, and reactive to light.   Neck:      Thyroid: No thyroid mass or thyromegaly.      Vascular: Normal carotid pulses. No carotid bruit or JVD.   Cardiovascular:      Rate and Rhythm: Normal rate and regular rhythm.      Heart sounds: No murmur heard.     No gallop.      Comments: Large varicose veins right leg.  Pulmonary:      Effort: Pulmonary effort is normal.      Breath sounds: Normal breath sounds. No wheezing or rales.   Abdominal:      General: Bowel sounds are normal. There is no distension.      Palpations: Abdomen is soft. There is no hepatomegaly, splenomegaly or mass.      Tenderness: There is no abdominal tenderness. There is no guarding or rebound. Negative signs include Killian's sign.   Musculoskeletal:      Right lower leg: No edema.      Left lower leg: No edema.   Lymphadenopathy:      Cervical: No cervical adenopathy.      Upper Body:      Right upper body: No supraclavicular adenopathy.      Left upper body: No supraclavicular adenopathy.   Skin:     Findings: No rash.   Neurological:      General: No focal deficit present.      Mental Status: He is alert and oriented to person, place, and time.   Psychiatric:         Mood and Affect: Mood normal.         Cognition and Memory: Cognition normal.

## 2024-11-06 NOTE — ASSESSMENT & PLAN NOTE
On Simvastatin 40 mg daily     Lab Results   Component Value Date    CHOLESTEROL 134 11/07/2023    CHOLESTEROL 155 11/15/2022    CHOLESTEROL 153 11/09/2021     Lab Results   Component Value Date    HDL 49 11/07/2023    HDL 37 (L) 11/15/2022    HDL 41 11/09/2021     Lab Results   Component Value Date    TRIG 93 11/07/2023    TRIG 218 (H) 11/15/2022    TRIG 161 (H) 11/09/2021     Lab Results   Component Value Date    LDLCALC 66 11/07/2023          Orders:    Lipid panel

## 2024-11-06 NOTE — PATIENT INSTRUCTIONS
Medicare Preventive Visit Patient Instructions  Thank you for completing your Welcome to Medicare Visit or Medicare Annual Wellness Visit today. Your next wellness visit will be due in one year (11/7/2025).  The screening/preventive services that you may require over the next 5-10 years are detailed below. Some tests may not apply to you based off risk factors and/or age. Screening tests ordered at today's visit but not completed yet may show as past due. Also, please note that scanned in results may not display below.  Preventive Screenings:  Service Recommendations Previous Testing/Comments   Colorectal Cancer Screening  Colonoscopy    Fecal Occult Blood Test (FOBT)/Fecal Immunochemical Test (FIT)  Fecal DNA/Cologuard Test  Flexible Sigmoidoscopy Age: 45-75 years old   Colonoscopy: every 10 years (May be performed more frequently if at higher risk)  OR  FOBT/FIT: every 1 year  OR  Cologuard: every 3 years  OR  Sigmoidoscopy: every 5 years  Screening may be recommended earlier than age 45 if at higher risk for colorectal cancer. Also, an individualized decision between you and your healthcare provider will decide whether screening between the ages of 76-85 would be appropriate. Colonoscopy: 04/29/2024  FOBT/FIT: Not on file  Cologuard: Not on file  Sigmoidoscopy: Not on file          Prostate Cancer Screening Individualized decision between patient and health care provider in men between ages of 55-69   Medicare will cover every 12 months beginning on the day after your 50th birthday PSA: 0.58 ng/mL           Hepatitis C Screening Once for adults born between 1945 and 1965  More frequently in patients at high risk for Hepatitis C Hep C Antibody: 02/01/2020        Diabetes Screening 1-2 times per year if you're at risk for diabetes or have pre-diabetes Fasting glucose: 98 mg/dL (11/7/2023)  A1C: 5.7 % (11/7/2023)      Cholesterol Screening Once every 5 years if you don't have a lipid disorder. May order more often  based on risk factors. Lipid panel: 11/07/2023         Other Preventive Screenings Covered by Medicare:  Abdominal Aortic Aneurysm (AAA) Screening: covered once if your at risk. You're considered to be at risk if you have a family history of AAA or a male between the age of 65-75 who smoking at least 100 cigarettes in your lifetime.  Lung Cancer Screening: covers low dose CT scan once per year if you meet all of the following conditions: (1) Age 55-77; (2) No signs or symptoms of lung cancer; (3) Current smoker or have quit smoking within the last 15 years; (4) You have a tobacco smoking history of at least 20 pack years (packs per day x number of years you smoked); (5) You get a written order from a healthcare provider.  Glaucoma Screening: covered annually if you're considered high risk: (1) You have diabetes OR (2) Family history of glaucoma OR (3)  aged 50 and older OR (4)  American aged 65 and older  Osteoporosis Screening: covered every 2 years if you meet one of the following conditions: (1) Have a vertebral abnormality; (2) On glucocorticoid therapy for more than 3 months; (3) Have primary hyperparathyroidism; (4) On osteoporosis medications and need to assess response to drug therapy.  HIV Screening: covered annually if you're between the age of 15-65. Also covered annually if you are younger than 15 and older than 65 with risk factors for HIV infection. For pregnant patients, it is covered up to 3 times per pregnancy.    Immunizations:  Immunization Recommendations   Influenza Vaccine Annual influenza vaccination during flu season is recommended for all persons aged >= 6 months who do not have contraindications   Pneumococcal Vaccine   * Pneumococcal conjugate vaccine = PCV13 (Prevnar 13), PCV15 (Vaxneuvance), PCV20 (Prevnar 20)  * Pneumococcal polysaccharide vaccine = PPSV23 (Pneumovax) Adults 19-65 yo with certain risk factors or if 65+ yo  If never received any pneumonia vaccine:  recommend Prevnar 20 (PCV20)  Give PCV20 if previously received 1 dose of PCV13 or PPSV23   Hepatitis B Vaccine 3 dose series if at intermediate or high risk (ex: diabetes, end stage renal disease, liver disease)   Respiratory syncytial virus (RSV) Vaccine - COVERED BY MEDICARE PART D  * RSVPreF3 (Arexvy) CDC recommends that adults 60 years of age and older may receive a single dose of RSV vaccine using shared clinical decision-making (SCDM)   Tetanus (Td) Vaccine - COST NOT COVERED BY MEDICARE PART B Following completion of primary series, a booster dose should be given every 10 years to maintain immunity against tetanus. Td may also be given as tetanus wound prophylaxis.   Tdap Vaccine - COST NOT COVERED BY MEDICARE PART B Recommended at least once for all adults. For pregnant patients, recommended with each pregnancy.   Shingles Vaccine (Shingrix) - COST NOT COVERED BY MEDICARE PART B  2 shot series recommended in those 19 years and older who have or will have weakened immune systems or those 50 years and older     Health Maintenance Due:      Topic Date Due   • Colorectal Cancer Screening  04/27/2034   • Hepatitis C Screening  Completed     Immunizations Due:      Topic Date Due   • Pneumococcal Vaccine: 65+ Years (1 of 1 - PCV) Never done   • Influenza Vaccine (1) Never done   • COVID-19 Vaccine (4 - 2023-24 season) 09/01/2024     Advance Directives   What are advance directives?  Advance directives are legal documents that state your wishes and plans for medical care. These plans are made ahead of time in case you lose your ability to make decisions for yourself. Advance directives can apply to any medical decision, such as the treatments you want, and if you want to donate organs.   What are the types of advance directives?  There are many types of advance directives, and each state has rules about how to use them. You may choose a combination of any of the following:  Living will:  This is a written record  of the treatment you want. You can also choose which treatments you do not want, which to limit, and which to stop at a certain time. This includes surgery, medicine, IV fluid, and tube feedings.   Durable power of  for healthcare (DPAHC):  This is a written record that states who you want to make healthcare choices for you when you are unable to make them for yourself. This person, called a proxy, is usually a family member or a friend. You may choose more than 1 proxy.  Do not resuscitate (DNR) order:  A DNR order is used in case your heart stops beating or you stop breathing. It is a request not to have certain forms of treatment, such as CPR. A DNR order may be included in other types of advance directives.  Medical directive:  This covers the care that you want if you are in a coma, near death, or unable to make decisions for yourself. You can list the treatments you want for each condition. Treatment may include pain medicine, surgery, blood transfusions, dialysis, IV or tube feedings, and a ventilator (breathing machine).  Values history:  This document has questions about your views, beliefs, and how you feel and think about life. This information can help others choose the care that you would choose.  Why are advance directives important?  An advance directive helps you control your care. Although spoken wishes may be used, it is better to have your wishes written down. Spoken wishes can be misunderstood, or not followed. Treatments may be given even if you do not want them. An advance directive may make it easier for your family to make difficult choices about your care.   Weight Management   Why it is important to manage your weight:  Being overweight increases your risk of health conditions such as heart disease, high blood pressure, type 2 diabetes, and certain types of cancer. It can also increase your risk for osteoarthritis, sleep apnea, and other respiratory problems. Aim for a slow, steady  weight loss. Even a small amount of weight loss can lower your risk of health problems.  How to lose weight safely:  A safe and healthy way to lose weight is to eat fewer calories and get regular exercise. You can lose up about 1 pound a week by decreasing the number of calories you eat by 500 calories each day.   Healthy meal plan for weight management:  A healthy meal plan includes a variety of foods, contains fewer calories, and helps you stay healthy. A healthy meal plan includes the following:  Eat whole-grain foods more often.  A healthy meal plan should contain fiber. Fiber is the part of grains, fruits, and vegetables that is not broken down by your body. Whole-grain foods are healthy and provide extra fiber in your diet. Some examples of whole-grain foods are whole-wheat breads and pastas, oatmeal, brown rice, and bulgur.  Eat a variety of vegetables every day.  Include dark, leafy greens such as spinach, kale, layla greens, and mustard greens. Eat yellow and orange vegetables such as carrots, sweet potatoes, and winter squash.   Eat a variety of fruits every day.  Choose fresh or canned fruit (canned in its own juice or light syrup) instead of juice. Fruit juice has very little or no fiber.  Eat low-fat dairy foods.  Drink fat-free (skim) milk or 1% milk. Eat fat-free yogurt and low-fat cottage cheese. Try low-fat cheeses such as mozzarella and other reduced-fat cheeses.  Choose meat and other protein foods that are low in fat.  Choose beans or other legumes such as split peas or lentils. Choose fish, skinless poultry (chicken or turkey), or lean cuts of red meat (beef or pork). Before you cook meat or poultry, cut off any visible fat.   Use less fat and oil.  Try baking foods instead of frying them. Add less fat, such as margarine, sour cream, regular salad dressing and mayonnaise to foods. Eat fewer high-fat foods. Some examples of high-fat foods include french fries, doughnuts, ice cream, and  cakes.  Eat fewer sweets.  Limit foods and drinks that are high in sugar. This includes candy, cookies, regular soda, and sweetened drinks.  Exercise:  Exercise at least 30 minutes per day on most days of the week. Some examples of exercise include walking, biking, dancing, and swimming. You can also fit in more physical activity by taking the stairs instead of the elevator or parking farther away from stores. Ask your healthcare provider about the best exercise plan for you.      © Copyright [employer] 2018 Information is for End User's use only and may not be sold, redistributed or otherwise used for commercial purposes. All illustrations and images included in CareNotes® are the copyrighted property of [employer], [employer]. or [employer]    Medicare Preventive Visit Patient Instructions  Thank you for completing your Welcome to Medicare Visit or Medicare Annual Wellness Visit today. Your next wellness visit will be due in one year (11/7/2025).  The screening/preventive services that you may require over the next 5-10 years are detailed below. Some tests may not apply to you based off risk factors and/or age. Screening tests ordered at today's visit but not completed yet may show as past due. Also, please note that scanned in results may not display below.  Preventive Screenings:  Service Recommendations Previous Testing/Comments   Colorectal Cancer Screening  Colonoscopy    Fecal Occult Blood Test (FOBT)/Fecal Immunochemical Test (FIT)  Fecal DNA/Cologuard Test  Flexible Sigmoidoscopy Age: 45-75 years old   Colonoscopy: every 10 years (May be performed more frequently if at higher risk)  OR  FOBT/FIT: every 1 year  OR  Cologuard: every 3 years  OR  Sigmoidoscopy: every 5 years  Screening may be recommended earlier than age 45 if at higher risk for colorectal cancer. Also, an individualized decision between you and your healthcare provider will decide whether screening between the ages of 76-85 would be  appropriate. Colonoscopy: 04/29/2024  FOBT/FIT: Not on file  Cologuard: Not on file  Sigmoidoscopy: Not on file          Prostate Cancer Screening Individualized decision between patient and health care provider in men between ages of 55-69   Medicare will cover every 12 months beginning on the day after your 50th birthday PSA: 0.58 ng/mL           Hepatitis C Screening Once for adults born between 1945 and 1965  More frequently in patients at high risk for Hepatitis C Hep C Antibody: 02/01/2020        Diabetes Screening 1-2 times per year if you're at risk for diabetes or have pre-diabetes Fasting glucose: 98 mg/dL (11/7/2023)  A1C: 5.7 % (11/7/2023)      Cholesterol Screening Once every 5 years if you don't have a lipid disorder. May order more often based on risk factors. Lipid panel: 11/07/2023         Other Preventive Screenings Covered by Medicare:  Abdominal Aortic Aneurysm (AAA) Screening: covered once if your at risk. You're considered to be at risk if you have a family history of AAA or a male between the age of 65-75 who smoking at least 100 cigarettes in your lifetime.  Lung Cancer Screening: covers low dose CT scan once per year if you meet all of the following conditions: (1) Age 55-77; (2) No signs or symptoms of lung cancer; (3) Current smoker or have quit smoking within the last 15 years; (4) You have a tobacco smoking history of at least 20 pack years (packs per day x number of years you smoked); (5) You get a written order from a healthcare provider.  Glaucoma Screening: covered annually if you're considered high risk: (1) You have diabetes OR (2) Family history of glaucoma OR (3)  aged 50 and older OR (4)  American aged 65 and older  Osteoporosis Screening: covered every 2 years if you meet one of the following conditions: (1) Have a vertebral abnormality; (2) On glucocorticoid therapy for more than 3 months; (3) Have primary hyperparathyroidism; (4) On osteoporosis  medications and need to assess response to drug therapy.  HIV Screening: covered annually if you're between the age of 15-65. Also covered annually if you are younger than 15 and older than 65 with risk factors for HIV infection. For pregnant patients, it is covered up to 3 times per pregnancy.    Immunizations:  Immunization Recommendations   Influenza Vaccine Annual influenza vaccination during flu season is recommended for all persons aged >= 6 months who do not have contraindications   Pneumococcal Vaccine   * Pneumococcal conjugate vaccine = PCV13 (Prevnar 13), PCV15 (Vaxneuvance), PCV20 (Prevnar 20)  * Pneumococcal polysaccharide vaccine = PPSV23 (Pneumovax) Adults 19-63 yo with certain risk factors or if 65+ yo  If never received any pneumonia vaccine: recommend Prevnar 20 (PCV20)  Give PCV20 if previously received 1 dose of PCV13 or PPSV23   Hepatitis B Vaccine 3 dose series if at intermediate or high risk (ex: diabetes, end stage renal disease, liver disease)   Respiratory syncytial virus (RSV) Vaccine - COVERED BY MEDICARE PART D  * RSVPreF3 (Arexvy) CDC recommends that adults 60 years of age and older may receive a single dose of RSV vaccine using shared clinical decision-making (SCDM)   Tetanus (Td) Vaccine - COST NOT COVERED BY MEDICARE PART B Following completion of primary series, a booster dose should be given every 10 years to maintain immunity against tetanus. Td may also be given as tetanus wound prophylaxis.   Tdap Vaccine - COST NOT COVERED BY MEDICARE PART B Recommended at least once for all adults. For pregnant patients, recommended with each pregnancy.   Shingles Vaccine (Shingrix) - COST NOT COVERED BY MEDICARE PART B  2 shot series recommended in those 19 years and older who have or will have weakened immune systems or those 50 years and older     Health Maintenance Due:      Topic Date Due   • Colorectal Cancer Screening  04/27/2034   • Hepatitis C Screening  Completed     Immunizations  Due:      Topic Date Due   • Pneumococcal Vaccine: 65+ Years (1 of 1 - PCV) Never done   • Influenza Vaccine (1) Never done   • COVID-19 Vaccine (4 - 2023-24 season) 09/01/2024     Advance Directives   What are advance directives?  Advance directives are legal documents that state your wishes and plans for medical care. These plans are made ahead of time in case you lose your ability to make decisions for yourself. Advance directives can apply to any medical decision, such as the treatments you want, and if you want to donate organs.   What are the types of advance directives?  There are many types of advance directives, and each state has rules about how to use them. You may choose a combination of any of the following:  Living will:  This is a written record of the treatment you want. You can also choose which treatments you do not want, which to limit, and which to stop at a certain time. This includes surgery, medicine, IV fluid, and tube feedings.   Durable power of  for healthcare (DPAHC):  This is a written record that states who you want to make healthcare choices for you when you are unable to make them for yourself. This person, called a proxy, is usually a family member or a friend. You may choose more than 1 proxy.  Do not resuscitate (DNR) order:  A DNR order is used in case your heart stops beating or you stop breathing. It is a request not to have certain forms of treatment, such as CPR. A DNR order may be included in other types of advance directives.  Medical directive:  This covers the care that you want if you are in a coma, near death, or unable to make decisions for yourself. You can list the treatments you want for each condition. Treatment may include pain medicine, surgery, blood transfusions, dialysis, IV or tube feedings, and a ventilator (breathing machine).  Values history:  This document has questions about your views, beliefs, and how you feel and think about life. This  information can help others choose the care that you would choose.  Why are advance directives important?  An advance directive helps you control your care. Although spoken wishes may be used, it is better to have your wishes written down. Spoken wishes can be misunderstood, or not followed. Treatments may be given even if you do not want them. An advance directive may make it easier for your family to make difficult choices about your care.   Weight Management   Why it is important to manage your weight:  Being overweight increases your risk of health conditions such as heart disease, high blood pressure, type 2 diabetes, and certain types of cancer. It can also increase your risk for osteoarthritis, sleep apnea, and other respiratory problems. Aim for a slow, steady weight loss. Even a small amount of weight loss can lower your risk of health problems.  How to lose weight safely:  A safe and healthy way to lose weight is to eat fewer calories and get regular exercise. You can lose up about 1 pound a week by decreasing the number of calories you eat by 500 calories each day.   Healthy meal plan for weight management:  A healthy meal plan includes a variety of foods, contains fewer calories, and helps you stay healthy. A healthy meal plan includes the following:  Eat whole-grain foods more often.  A healthy meal plan should contain fiber. Fiber is the part of grains, fruits, and vegetables that is not broken down by your body. Whole-grain foods are healthy and provide extra fiber in your diet. Some examples of whole-grain foods are whole-wheat breads and pastas, oatmeal, brown rice, and bulgur.  Eat a variety of vegetables every day.  Include dark, leafy greens such as spinach, kale, layla greens, and mustard greens. Eat yellow and orange vegetables such as carrots, sweet potatoes, and winter squash.   Eat a variety of fruits every day.  Choose fresh or canned fruit (canned in its own juice or light syrup) instead  of juice. Fruit juice has very little or no fiber.  Eat low-fat dairy foods.  Drink fat-free (skim) milk or 1% milk. Eat fat-free yogurt and low-fat cottage cheese. Try low-fat cheeses such as mozzarella and other reduced-fat cheeses.  Choose meat and other protein foods that are low in fat.  Choose beans or other legumes such as split peas or lentils. Choose fish, skinless poultry (chicken or turkey), or lean cuts of red meat (beef or pork). Before you cook meat or poultry, cut off any visible fat.   Use less fat and oil.  Try baking foods instead of frying them. Add less fat, such as margarine, sour cream, regular salad dressing and mayonnaise to foods. Eat fewer high-fat foods. Some examples of high-fat foods include french fries, doughnuts, ice cream, and cakes.  Eat fewer sweets.  Limit foods and drinks that are high in sugar. This includes candy, cookies, regular soda, and sweetened drinks.  Exercise:  Exercise at least 30 minutes per day on most days of the week. Some examples of exercise include walking, biking, dancing, and swimming. You can also fit in more physical activity by taking the stairs instead of the elevator or parking farther away from stores. Ask your healthcare provider about the best exercise plan for you.      © Copyright [employer] 2018 Information is for End User's use only and may not be sold, redistributed or otherwise used for commercial purposes. All illustrations and images included in CareNotes® are the copyrighted property of [employer] or [employer]

## 2024-11-06 NOTE — ASSESSMENT & PLAN NOTE
No longer on Lisinopril. 26 lb weight gain from 05/2024. Less active due to back issues       BP Readings from Last 3 Encounters:   11/06/24 146/82   09/16/24 148/82   07/11/24 125/70         Lab Results   Component Value Date     01/11/2017    SODIUM 140 04/06/2024    K 3.7 04/06/2024     04/06/2024    CO2 28 04/06/2024    AGAP 9 04/06/2024    BUN 20 04/06/2024    CREATININE 0.74 04/06/2024    GLUC 111 (H) 04/06/2024    GLUF 98 11/07/2023    CALCIUM 9.5 04/06/2024    AST 29 04/06/2024    ALT 29 04/06/2024    ALKPHOS 56 04/06/2024    PROT 7.0 01/11/2017    TP 7.4 04/06/2024    BILITOT 0.6 01/11/2017    TBILI 0.5 04/06/2024    EGFR 100 04/06/2024     Lab Results   Component Value Date    WBC 5.86 11/07/2023    HGB 15.3 11/07/2023    HCT 45.2 11/07/2023    MCV 92 11/07/2023     11/07/2023     Diet, weight loss and exercise.  Monitor BP    Orders:    Comprehensive metabolic panel    CBC and differential

## 2024-11-06 NOTE — PROGRESS NOTES
Ambulatory Visit  Name: Rupesh Doyle      : 1960      MRN: [id number]  Encounter Provider: Rupesh Otto MD  Encounter Date: 2024   Encounter department: [facility]    Assessment & Plan       Preventive health issues were discussed with patient, and age appropriate screening tests were ordered as noted in patient's After Visit Summary. Personalized health advice and appropriate referrals for health education or preventive services given if needed, as noted in patient's After Visit Summary.    History of Present Illness   {?Quick Links Encounters * My Last Note * Last Note in Specialty * Snapshot * Since Last Visit * History :02255}  HPI   Patient Care Team:  Rupesh Otto MD as PCP - General  Suzanna Clayton MD as Endoscopist    Review of Systems  Medical History Reviewed by provider this encounter:       Annual Wellness Visit Questionnaire   Annual Wellness Visit  Social Determinants of Health     Financial Resource Strain: Patient Declined (10/27/2023)    Overall Financial Resource Strain (CARDIA)     Difficulty of Paying Living Expenses: Patient declined   Food Insecurity: No Food Insecurity (10/31/2024)    Hunger Vital Sign     Worried About Running Out of Food in the Last Year: Never true     Ran Out of Food in the Last Year: Never true   Transportation Needs: No Transportation Needs (10/31/2024)    PRAPARE - Transportation     Lack of Transportation (Medical): No     Lack of Transportation (Non-Medical): No   Housing Stability: Low Risk  (10/31/2024)    Housing Stability Vital Sign     Unable to Pay for Housing in the Last Year: No     Number of Times Moved in the Last Year: 0     Homeless in the Last Year: No   Utilities: Not At Risk (10/31/2024)    [facility] Utilities     Threatened with loss of utilities: No     No results found.    Objective   {?Quick Links Trend Vitals * Enter New Vitals * Results Review * Timeline (Adult) * Labs * Imaging * Cardiology * Procedures *  "Lung Cancer Screening * Surgical eConsent :54141}  /82 (BP Location: Left arm, Patient Position: Sitting, Cuff Size: Large)   Pulse (!) 18   Temp 97.8 °F (36.6 °C)   Resp 18   Ht 6' 2\" (1.88 m)   Wt 99.8 kg (220 lb)   SpO2 98%   BMI 28.25 kg/m²     Physical Exam  {Administrative / Billing Section (Optional):41841}  "

## 2024-11-12 ENCOUNTER — APPOINTMENT (OUTPATIENT)
Dept: LAB | Facility: CLINIC | Age: 64
End: 2024-11-12
Payer: MEDICARE

## 2024-11-12 DIAGNOSIS — E79.0 HYPERURICEMIA: ICD-10-CM

## 2024-11-12 DIAGNOSIS — Z12.5 SCREENING FOR PROSTATE CANCER: ICD-10-CM

## 2024-11-12 LAB
ALBUMIN SERPL BCG-MCNC: 4.2 G/DL (ref 3.5–5)
ALP SERPL-CCNC: 61 U/L (ref 34–104)
ALT SERPL W P-5'-P-CCNC: 28 U/L (ref 7–52)
ANION GAP SERPL CALCULATED.3IONS-SCNC: 7 MMOL/L (ref 4–13)
AST SERPL W P-5'-P-CCNC: 27 U/L (ref 13–39)
BASOPHILS # BLD AUTO: 0.06 THOUSANDS/ÂΜL (ref 0–0.1)
BASOPHILS NFR BLD AUTO: 1 % (ref 0–1)
BILIRUB SERPL-MCNC: 0.68 MG/DL (ref 0.2–1)
BUN SERPL-MCNC: 12 MG/DL (ref 5–25)
CALCIUM SERPL-MCNC: 9 MG/DL (ref 8.4–10.2)
CHLORIDE SERPL-SCNC: 104 MMOL/L (ref 96–108)
CHOLEST SERPL-MCNC: 131 MG/DL
CO2 SERPL-SCNC: 27 MMOL/L (ref 21–32)
CREAT SERPL-MCNC: 0.74 MG/DL (ref 0.6–1.3)
EOSINOPHIL # BLD AUTO: 0.13 THOUSAND/ÂΜL (ref 0–0.61)
EOSINOPHIL NFR BLD AUTO: 3 % (ref 0–6)
ERYTHROCYTE [DISTWIDTH] IN BLOOD BY AUTOMATED COUNT: 12.9 % (ref 11.6–15.1)
EST. AVERAGE GLUCOSE BLD GHB EST-MCNC: 105 MG/DL
GFR SERPL CREATININE-BSD FRML MDRD: 96 ML/MIN/1.73SQ M
GLUCOSE P FAST SERPL-MCNC: 90 MG/DL (ref 65–99)
HBA1C MFR BLD: 5.3 %
HCT VFR BLD AUTO: 43.2 % (ref 36.5–49.3)
HDLC SERPL-MCNC: 48 MG/DL
HGB BLD-MCNC: 14.4 G/DL (ref 12–17)
IMM GRANULOCYTES # BLD AUTO: 0.01 THOUSAND/UL (ref 0–0.2)
IMM GRANULOCYTES NFR BLD AUTO: 0 % (ref 0–2)
LDLC SERPL CALC-MCNC: 64 MG/DL (ref 0–100)
LYMPHOCYTES # BLD AUTO: 1.59 THOUSANDS/ÂΜL (ref 0.6–4.47)
LYMPHOCYTES NFR BLD AUTO: 30 % (ref 14–44)
MCH RBC QN AUTO: 30.6 PG (ref 26.8–34.3)
MCHC RBC AUTO-ENTMCNC: 33.3 G/DL (ref 31.4–37.4)
MCV RBC AUTO: 92 FL (ref 82–98)
MONOCYTES # BLD AUTO: 0.48 THOUSAND/ÂΜL (ref 0.17–1.22)
MONOCYTES NFR BLD AUTO: 9 % (ref 4–12)
NEUTROPHILS # BLD AUTO: 2.97 THOUSANDS/ÂΜL (ref 1.85–7.62)
NEUTS SEG NFR BLD AUTO: 57 % (ref 43–75)
NONHDLC SERPL-MCNC: 83 MG/DL
NRBC BLD AUTO-RTO: 0 /100 WBCS
PLATELET # BLD AUTO: 184 THOUSANDS/UL (ref 149–390)
PMV BLD AUTO: 11 FL (ref 8.9–12.7)
POTASSIUM SERPL-SCNC: 4.2 MMOL/L (ref 3.5–5.3)
PROT SERPL-MCNC: 6.9 G/DL (ref 6.4–8.4)
PSA SERPL-MCNC: 0.63 NG/ML (ref 0–4)
RBC # BLD AUTO: 4.7 MILLION/UL (ref 3.88–5.62)
SODIUM SERPL-SCNC: 138 MMOL/L (ref 135–147)
TRIGL SERPL-MCNC: 94 MG/DL
URATE SERPL-MCNC: 4.1 MG/DL (ref 3.5–8.5)
WBC # BLD AUTO: 5.24 THOUSAND/UL (ref 4.31–10.16)

## 2024-11-12 PROCEDURE — 85025 COMPLETE CBC W/AUTO DIFF WBC: CPT | Performed by: FAMILY MEDICINE

## 2024-11-12 PROCEDURE — 36415 COLL VENOUS BLD VENIPUNCTURE: CPT

## 2024-11-12 PROCEDURE — 83036 HEMOGLOBIN GLYCOSYLATED A1C: CPT | Performed by: FAMILY MEDICINE

## 2024-11-12 PROCEDURE — 80053 COMPREHEN METABOLIC PANEL: CPT | Performed by: FAMILY MEDICINE

## 2024-11-12 PROCEDURE — G0103 PSA SCREENING: HCPCS

## 2024-11-12 PROCEDURE — 80061 LIPID PANEL: CPT | Performed by: FAMILY MEDICINE

## 2024-11-12 PROCEDURE — 84550 ASSAY OF BLOOD/URIC ACID: CPT

## 2024-11-13 ENCOUNTER — RESULTS FOLLOW-UP (OUTPATIENT)
Dept: FAMILY MEDICINE CLINIC | Facility: CLINIC | Age: 64
End: 2024-11-13

## 2025-01-10 DIAGNOSIS — M51.16 INTERVERTEBRAL DISC DISORDER WITH RADICULOPATHY OF LUMBAR REGION: ICD-10-CM

## 2025-01-10 NOTE — TELEPHONE ENCOUNTER
Caller: Patient    Doctor: PAULIE    Reason for call: Returning missed call regarding refill    Call back#:

## 2025-01-10 NOTE — TELEPHONE ENCOUNTER
Reason for call:   [x] Refill   [] Prior Auth  [] Other:     Office:   [x] PCP/Provider -   [] Specialty/Provider -     Medication: Gabapentin    Dose/Frequency: 300 mg    Quantity: 60    Pharmacy: [facility]/pharmacy #[id number] - [place] - [address]  [address] [address]  Phone: [phone]  Fax: [phone]     Does the patient have enough for 3 days?   [x] Yes   [] No - Send as HP to POD     Benzoyl Peroxide Counseling: Patient counseled that medicine may cause skin irritation and bleach clothing.  In the event of skin irritation, the patient was advised to reduce the amount of the drug applied or use it less frequently.   The patient verbalized understanding of the proper use and possible adverse effects of benzoyl peroxide.  All of the patient's questions and concerns were addressed.

## 2025-01-10 NOTE — TELEPHONE ENCOUNTER
LMOM to c/b, c/b# and OH given.    **called pt to confirm he is still taking twice a day and current dose is helping with no s/e

## 2025-01-13 RX ORDER — GABAPENTIN 300 MG/1
300 CAPSULE ORAL 2 TIMES DAILY
Qty: 60 CAPSULE | Refills: 5 | Status: SHIPPED | OUTPATIENT
Start: 2025-01-13

## 2025-01-13 NOTE — TELEPHONE ENCOUNTER
2nd attempt.    Left vmom for pt to c/b. C/B # and OH provided.  *need pt to confirm how many times per day he takes the gabapentin? Is it helping? Is it causing any s/e?

## 2025-01-13 NOTE — TELEPHONE ENCOUNTER
Caller: Rupesh     Doctor: [place]     Reason for call: Patient returning call stating takes Gabapentin 2x a day and states medication is helping with no side effects. Please leave message if unable to contact or call goes to voicemail please advise     Call back#: [phone]

## 2025-01-13 NOTE — TELEPHONE ENCOUNTER
Pt c/b and confirmed he is taking the gabapentin twice a day , it is helping and no s/e.  This was LP for #60 w/ 2 RF on 9/16/24. LOVS 9/16/24.  Pls send refill.

## 2025-08-16 DIAGNOSIS — M51.16 INTERVERTEBRAL DISC DISORDER WITH RADICULOPATHY OF LUMBAR REGION: ICD-10-CM

## 2025-08-18 RX ORDER — GABAPENTIN 300 MG/1
300 CAPSULE ORAL 2 TIMES DAILY
Qty: 60 CAPSULE | Refills: 5 | OUTPATIENT
Start: 2025-08-18